# Patient Record
Sex: MALE | Race: OTHER | Employment: FULL TIME | URBAN - METROPOLITAN AREA
[De-identification: names, ages, dates, MRNs, and addresses within clinical notes are randomized per-mention and may not be internally consistent; named-entity substitution may affect disease eponyms.]

---

## 2017-08-08 ENCOUNTER — OFFICE VISIT (OUTPATIENT)
Dept: INTERNAL MEDICINE CLINIC | Facility: CLINIC | Age: 32
End: 2017-08-08

## 2017-08-08 VITALS
TEMPERATURE: 98 F | BODY MASS INDEX: 25.44 KG/M2 | SYSTOLIC BLOOD PRESSURE: 124 MMHG | DIASTOLIC BLOOD PRESSURE: 70 MMHG | HEIGHT: 71 IN | RESPIRATION RATE: 16 BRPM | OXYGEN SATURATION: 99 % | HEART RATE: 64 BPM | WEIGHT: 181.75 LBS

## 2017-08-08 DIAGNOSIS — S89.82XA HYPEREXTENSION INJURY OF LEFT KNEE, INITIAL ENCOUNTER: Primary | ICD-10-CM

## 2017-08-08 DIAGNOSIS — Z72.0 TOBACCO ABUSE: ICD-10-CM

## 2017-08-08 PROCEDURE — 99213 OFFICE O/P EST LOW 20 MIN: CPT | Performed by: PHYSICIAN ASSISTANT

## 2017-08-08 NOTE — PROGRESS NOTES
Sheri Cason is a 29year old male. HPI:   Patient presents for L knee pain which began last night. Was walking down the stairs when he hyperextended his knee as he took a step.   No immediate pain but awoke this morning with pain just below his bowser hyperextension injury: discussed the use of ice, NSAIDs, activity modification. Ok to wear ace wrap for a few days for symptom relief. Consider imaging to eval for medial meniscus injury if no improvement in the next couple weeks.   # Tobacco abuse: cessa

## 2017-10-21 ENCOUNTER — OFFICE VISIT (OUTPATIENT)
Dept: FAMILY MEDICINE CLINIC | Facility: CLINIC | Age: 32
End: 2017-10-21

## 2017-10-21 VITALS
DIASTOLIC BLOOD PRESSURE: 78 MMHG | BODY MASS INDEX: 25.48 KG/M2 | OXYGEN SATURATION: 98 % | HEART RATE: 82 BPM | RESPIRATION RATE: 16 BRPM | SYSTOLIC BLOOD PRESSURE: 140 MMHG | TEMPERATURE: 98 F | HEIGHT: 71 IN | WEIGHT: 182 LBS

## 2017-10-21 DIAGNOSIS — Z00.00 ROUTINE GENERAL MEDICAL EXAMINATION AT A HEALTH CARE FACILITY: Primary | ICD-10-CM

## 2017-10-21 DIAGNOSIS — Z80.0 FAMILY HISTORY OF COLON CANCER IN FATHER: ICD-10-CM

## 2017-10-21 PROCEDURE — 99395 PREV VISIT EST AGE 18-39: CPT | Performed by: FAMILY MEDICINE

## 2017-10-21 NOTE — PATIENT INSTRUCTIONS
Prevention Guidelines, Men Ages 25 to 44  Screening tests and vaccines are an important part of managing your health. Health counseling is essential, too. Below are guidelines for these, for men ages 25 to 44.  Talk with your healthcare provider to make s Hepatitis B Men at increased risk for infection – talk with your healthcare provider 3 doses over 6 months; second dose should be given 1 month after the first dose; the third dose should be given at least 2 months after the second dose and at least 4 jhony © 1511-5216 [employer] [address]. [address] [facility] [place], [facility] [place]. All rights reserved. This information is not intended as a substitute for professional medical care. Always follow your healthcare professional's instructions.

## 2017-10-21 NOTE — PROGRESS NOTES
Adama Bowman is a 29year old male who presents for a complete physical exam.   HPI:   Pt complains of nothing today. Denies any recent illnesses or hospitalizations. He is a homosexual man in only has sex with men.   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 : single. Children: none. Exercise: three times per week.   Diet: watches minimally     REVIEW OF SYSTEMS:   GENERAL: feels well otherwise  SKIN: denies any unusual skin lesions  EYES:denies blurred vision o and aerobic exercise 30 minutes three times weekly.    Health maintenance, will check:   Orders Placed This Encounter      Comp Metabolic Panel (14) [E]      CBC W Differential W Platelet [E]      Lipid Panel [E]      TSH W Reflex To Free T4 [E]      Vitami

## 2017-11-16 ENCOUNTER — LAB ENCOUNTER (OUTPATIENT)
Dept: LAB | Age: 32
End: 2017-11-16
Attending: FAMILY MEDICINE
Payer: COMMERCIAL

## 2017-11-16 DIAGNOSIS — Z00.00 ROUTINE GENERAL MEDICAL EXAMINATION AT A HEALTH CARE FACILITY: ICD-10-CM

## 2017-11-16 PROCEDURE — 85025 COMPLETE CBC W/AUTO DIFF WBC: CPT

## 2017-11-16 PROCEDURE — 84443 ASSAY THYROID STIM HORMONE: CPT

## 2017-11-16 PROCEDURE — 80053 COMPREHEN METABOLIC PANEL: CPT

## 2017-11-16 PROCEDURE — 36415 COLL VENOUS BLD VENIPUNCTURE: CPT

## 2017-11-16 PROCEDURE — 82306 VITAMIN D 25 HYDROXY: CPT

## 2017-11-16 PROCEDURE — 80061 LIPID PANEL: CPT

## 2017-11-16 PROCEDURE — 81003 URINALYSIS AUTO W/O SCOPE: CPT

## 2017-11-20 DIAGNOSIS — R31.9 HEMATURIA, UNSPECIFIED TYPE: ICD-10-CM

## 2017-11-20 DIAGNOSIS — R73.09 ELEVATED GLUCOSE LEVEL: ICD-10-CM

## 2017-11-20 DIAGNOSIS — E55.9 VITAMIN D DEFICIENCY: ICD-10-CM

## 2017-11-20 DIAGNOSIS — E78.2 ELEVATED TRIGLYCERIDES WITH HIGH CHOLESTEROL: ICD-10-CM

## 2017-11-20 DIAGNOSIS — R79.89 ELEVATED LFTS: Primary | ICD-10-CM

## 2017-11-20 NOTE — PROGRESS NOTES
Spoke with Darian at the hospital lab. She stated that the A1C could be added but she had to see the blood to check if the hepatitis panel could be done. She will call the office back line tomorrow morning if not. Please see pended orders.   Thank you

## 2017-11-21 ENCOUNTER — TELEPHONE (OUTPATIENT)
Dept: FAMILY MEDICINE CLINIC | Facility: CLINIC | Age: 32
End: 2017-11-21

## 2017-11-21 DIAGNOSIS — R73.01 IMPAIRED FASTING GLUCOSE: ICD-10-CM

## 2017-11-21 DIAGNOSIS — R79.89 ELEVATED LFTS: Primary | ICD-10-CM

## 2017-11-21 RX ORDER — ERGOCALCIFEROL 1.25 MG/1
50000 CAPSULE ORAL WEEKLY
Qty: 24 CAPSULE | Refills: 0 | Status: SHIPPED | OUTPATIENT
Start: 2017-11-21 | End: 2018-05-20

## 2017-12-27 ENCOUNTER — TELEPHONE (OUTPATIENT)
Dept: FAMILY MEDICINE CLINIC | Facility: CLINIC | Age: 32
End: 2017-12-27

## 2017-12-27 NOTE — TELEPHONE ENCOUNTER
Patient called is an employee of [employer] and needs his Biometric Form sent. Do not see one in chart. Please advise. Thank you.

## 2017-12-27 NOTE — TELEPHONE ENCOUNTER
Call to pt-sts provided form to dr reagan at appt 10/21/17. Per Pancho GIBSON/RN with dr reagan, was waiting for pt to do labs. Record shows labs done 11/16/17. Results entered on form, but pt has not signed form.   Call to pt-advised of above info-pt requests we

## 2018-12-08 ENCOUNTER — OFFICE VISIT (OUTPATIENT)
Dept: FAMILY MEDICINE CLINIC | Facility: CLINIC | Age: 33
End: 2018-12-08
Payer: COMMERCIAL

## 2018-12-08 VITALS
WEIGHT: 181 LBS | BODY MASS INDEX: 25.34 KG/M2 | TEMPERATURE: 97 F | RESPIRATION RATE: 16 BRPM | SYSTOLIC BLOOD PRESSURE: 122 MMHG | HEIGHT: 71 IN | DIASTOLIC BLOOD PRESSURE: 72 MMHG | HEART RATE: 84 BPM

## 2018-12-08 DIAGNOSIS — Z11.3 SCREEN FOR STD (SEXUALLY TRANSMITTED DISEASE): ICD-10-CM

## 2018-12-08 DIAGNOSIS — Z00.00 ROUTINE GENERAL MEDICAL EXAMINATION AT A HEALTH CARE FACILITY: Primary | ICD-10-CM

## 2018-12-08 DIAGNOSIS — R73.01 IMPAIRED FASTING GLUCOSE: ICD-10-CM

## 2018-12-08 PROCEDURE — 99395 PREV VISIT EST AGE 18-39: CPT | Performed by: FAMILY MEDICINE

## 2018-12-08 NOTE — PROGRESS NOTES
Lea Frost is a 34year old male who presents for a complete physical exam.   HPI:   Pt complains of nothing today. Denies any recent illnesses or hospitalizations. He is a homosexual man in only has sex with men. He is engaged to his partner.   He al Negative Negative mg/dl    Urobilinogen Urine 0.2 0.2 - 2.0 mg/dL    Nitrite Urine Negative Negative    Leukocyte Esterase Urine Negative Negative    Microscopic Microscopic not indicated    CBC W/ DIFFERENTIAL   Result Value Ref Range    WBC 9.1 4.0 - 13. REVIEW OF SYSTEMS:   GENERAL: feels well otherwise  SKIN: denies any unusual skin lesions  EYES:denies blurred vision or double vision  HEENT: denies nasal congestion, sinus pain or ST  LUNGS: denies shortness of breath with exertion, denies cough  CAR Metabolic Panel (14) [E]      CBC W Differential W Platelet [E]      TSH W Reflex To Free T4 [E]      Hemoglobin A1C [E]      UA/M With Culture Reflex [E]      HIV AG AB Combo [E]      T Pallidum Screening Cascade [E]      Hepatitis Panel, Acute (4) [E]

## 2018-12-13 ENCOUNTER — LAB ENCOUNTER (OUTPATIENT)
Dept: LAB | Age: 33
End: 2018-12-13
Attending: FAMILY MEDICINE
Payer: COMMERCIAL

## 2018-12-13 DIAGNOSIS — R73.01 IMPAIRED FASTING GLUCOSE: ICD-10-CM

## 2018-12-13 DIAGNOSIS — Z11.3 SCREEN FOR STD (SEXUALLY TRANSMITTED DISEASE): ICD-10-CM

## 2018-12-13 DIAGNOSIS — Z00.00 ROUTINE GENERAL MEDICAL EXAMINATION AT A HEALTH CARE FACILITY: ICD-10-CM

## 2018-12-13 DIAGNOSIS — R74.8 ELEVATED LIVER ENZYMES: ICD-10-CM

## 2018-12-13 DIAGNOSIS — R31.9 HEMATURIA, UNSPECIFIED TYPE: Primary | ICD-10-CM

## 2018-12-13 PROCEDURE — 36415 COLL VENOUS BLD VENIPUNCTURE: CPT

## 2018-12-13 PROCEDURE — 80074 ACUTE HEPATITIS PANEL: CPT

## 2018-12-13 PROCEDURE — 80061 LIPID PANEL: CPT

## 2018-12-13 PROCEDURE — 85025 COMPLETE CBC W/AUTO DIFF WBC: CPT

## 2018-12-13 PROCEDURE — 87389 HIV-1 AG W/HIV-1&-2 AB AG IA: CPT

## 2018-12-13 PROCEDURE — 84443 ASSAY THYROID STIM HORMONE: CPT

## 2018-12-13 PROCEDURE — 87491 CHLMYD TRACH DNA AMP PROBE: CPT

## 2018-12-13 PROCEDURE — 81001 URINALYSIS AUTO W/SCOPE: CPT

## 2018-12-13 PROCEDURE — 86780 TREPONEMA PALLIDUM: CPT

## 2018-12-13 PROCEDURE — 87591 N.GONORRHOEAE DNA AMP PROB: CPT

## 2018-12-13 PROCEDURE — 80053 COMPREHEN METABOLIC PANEL: CPT

## 2018-12-13 PROCEDURE — 83036 HEMOGLOBIN GLYCOSYLATED A1C: CPT

## 2019-02-06 DIAGNOSIS — J02.0 STREP PHARYNGITIS: Primary | ICD-10-CM

## 2019-02-06 RX ORDER — AMOXICILLIN 875 MG/1
875 TABLET, COATED ORAL 2 TIMES DAILY
Qty: 20 TABLET | Refills: 0 | Status: SHIPPED | OUTPATIENT
Start: 2019-02-06 | End: 2019-02-16

## 2019-02-06 NOTE — PROGRESS NOTES
Patient with sore throat, tonsillar swelling, fevers, URI symptoms for past 1-2 days. In-office strep done, positive. Amoxicillin prescription sent to pharmacy. Recommended patient stay off work for 24 hours.

## 2019-07-31 ENCOUNTER — TELEPHONE (OUTPATIENT)
Dept: INTERNAL MEDICINE CLINIC | Facility: CLINIC | Age: 34
End: 2019-07-31

## 2019-11-16 ENCOUNTER — OFFICE VISIT (OUTPATIENT)
Dept: FAMILY MEDICINE CLINIC | Facility: CLINIC | Age: 34
End: 2019-11-16
Payer: COMMERCIAL

## 2019-11-16 VITALS
RESPIRATION RATE: 16 BRPM | WEIGHT: 183 LBS | TEMPERATURE: 98 F | HEIGHT: 71 IN | DIASTOLIC BLOOD PRESSURE: 78 MMHG | SYSTOLIC BLOOD PRESSURE: 118 MMHG | BODY MASS INDEX: 25.62 KG/M2 | HEART RATE: 82 BPM

## 2019-11-16 DIAGNOSIS — Z11.3 SCREEN FOR STD (SEXUALLY TRANSMITTED DISEASE): ICD-10-CM

## 2019-11-16 DIAGNOSIS — Z00.00 ROUTINE GENERAL MEDICAL EXAMINATION AT A HEALTH CARE FACILITY: Primary | ICD-10-CM

## 2019-11-16 PROCEDURE — 99395 PREV VISIT EST AGE 18-39: CPT | Performed by: FAMILY MEDICINE

## 2019-11-16 NOTE — PROGRESS NOTES
Tiarra James is a 30year old male who presents for a complete physical exam.   HPI:   Pt complains of nothing today. Denies any recent illnesses or hospitalizations. He is a homosexual man in only has sex with men. He is  to his partner.   He al Value Ref Range    Urine Color Yellow Yellow    Clarity Urine Clear Clear    Spec Gravity 1.026 1.001 - 1.030    Glucose Urine Negative Negative mg/dl    Bilirubin Urine Negative Negative    Ketones Urine Negative Negative mg/dL    Blood Urine Small (A) Ne Absolute 3.82 1.30 - 6.70 x10(3) uL    Lymphocyte Absolute 2.52 0.90 - 4.00 x10(3) uL    Monocyte Absolute 0.66 0.10 - 1.00 x10(3) uL    Eosinophil Absolute 0.29 0.00 - 0.30 x10(3) uL    Basophil Absolute 0.04 0.00 - 0.10 x10(3) uL    Immature Granulocyte history  ALL/ASTHMA: denies hx of allergy or asthma    EXAM:   /78 (BP Location: Right arm, Patient Position: Sitting, Cuff Size: adult)   Pulse 82   Temp 97.5 °F (36.4 °C)   Resp 16   Ht 71\"   Wt 183 lb (83 kg)   BMI 25.52 kg/m²   Body mass index i

## 2019-11-18 ENCOUNTER — LAB ENCOUNTER (OUTPATIENT)
Dept: LAB | Age: 34
End: 2019-11-18
Attending: FAMILY MEDICINE
Payer: COMMERCIAL

## 2019-11-18 DIAGNOSIS — Z00.00 ROUTINE GENERAL MEDICAL EXAMINATION AT A HEALTH CARE FACILITY: ICD-10-CM

## 2019-11-18 DIAGNOSIS — Z11.3 SCREEN FOR STD (SEXUALLY TRANSMITTED DISEASE): ICD-10-CM

## 2019-11-18 PROCEDURE — 87591 N.GONORRHOEAE DNA AMP PROB: CPT

## 2019-11-18 PROCEDURE — 84443 ASSAY THYROID STIM HORMONE: CPT

## 2019-11-18 PROCEDURE — 87389 HIV-1 AG W/HIV-1&-2 AB AG IA: CPT

## 2019-11-18 PROCEDURE — 80061 LIPID PANEL: CPT

## 2019-11-18 PROCEDURE — 80053 COMPREHEN METABOLIC PANEL: CPT

## 2019-11-18 PROCEDURE — 36415 COLL VENOUS BLD VENIPUNCTURE: CPT

## 2019-11-18 PROCEDURE — 85025 COMPLETE CBC W/AUTO DIFF WBC: CPT

## 2019-11-18 PROCEDURE — 87491 CHLMYD TRACH DNA AMP PROBE: CPT

## 2019-11-18 PROCEDURE — 81001 URINALYSIS AUTO W/SCOPE: CPT

## 2019-11-18 PROCEDURE — 86780 TREPONEMA PALLIDUM: CPT

## 2019-11-20 DIAGNOSIS — R79.9 ABNORMAL BLOOD FINDINGS: ICD-10-CM

## 2019-11-20 DIAGNOSIS — R74.8 ELEVATED LIVER ENZYMES: Primary | ICD-10-CM

## 2020-01-09 ENCOUNTER — OFFICE VISIT (OUTPATIENT)
Dept: INTERNAL MEDICINE CLINIC | Facility: CLINIC | Age: 35
End: 2020-01-09
Payer: COMMERCIAL

## 2020-01-09 VITALS
OXYGEN SATURATION: 98 % | SYSTOLIC BLOOD PRESSURE: 120 MMHG | HEART RATE: 84 BPM | WEIGHT: 187.25 LBS | RESPIRATION RATE: 16 BRPM | BODY MASS INDEX: 26.22 KG/M2 | DIASTOLIC BLOOD PRESSURE: 86 MMHG | TEMPERATURE: 98 F | HEIGHT: 71 IN

## 2020-01-09 DIAGNOSIS — H00.015 HORDEOLUM EXTERNUM LEFT LOWER EYELID: Primary | ICD-10-CM

## 2020-01-09 PROCEDURE — 99213 OFFICE O/P EST LOW 20 MIN: CPT | Performed by: PHYSICIAN ASSISTANT

## 2020-01-09 RX ORDER — ERYTHROMYCIN 5 MG/G
OINTMENT OPHTHALMIC
Qty: 1 TUBE | Refills: 0 | Status: SHIPPED | OUTPATIENT
Start: 2020-01-09 | End: 2020-06-30 | Stop reason: ALTCHOICE

## 2020-01-09 NOTE — PATIENT INSTRUCTIONS
Sty (or Stye)  A sty is an infection of the oil gland of the eyelid. It may develop into a small pocket of pus (an abscess). This can cause pain, redness, and swelling.  In early stages, a sty is treated with antibiotic cream, eye drops, or a small towel © 4697-7567 [employer] [address]. [address] [facility] [place], [place], [facility] [place]. All rights reserved. This information is not intended as a substitute for professional medical care. Always follow your healthcare professional's instructions.

## 2020-01-09 NOTE — PROGRESS NOTES
Zakia Cavanaugh is a 30year old male. HPI:   Patient presents for L eye symptoms x 4 days. C/o red bump near medial canthus of L lower eyelid. Tender to touch and is having some pain radiating around eye.   Headaches - worse when looking at computer sc patient is asked to return in 48 hours if no improvement or worsening of symptoms.

## 2020-06-30 ENCOUNTER — OFFICE VISIT (OUTPATIENT)
Dept: INTERNAL MEDICINE CLINIC | Facility: CLINIC | Age: 35
End: 2020-06-30
Payer: COMMERCIAL

## 2020-06-30 VITALS
TEMPERATURE: 99 F | RESPIRATION RATE: 18 BRPM | BODY MASS INDEX: 27 KG/M2 | OXYGEN SATURATION: 98 % | HEART RATE: 106 BPM | SYSTOLIC BLOOD PRESSURE: 130 MMHG | WEIGHT: 191 LBS | DIASTOLIC BLOOD PRESSURE: 84 MMHG

## 2020-06-30 DIAGNOSIS — L60.8 DISCOLORATION OF NAIL: ICD-10-CM

## 2020-06-30 DIAGNOSIS — L60.0 INGROWN NAIL OF GREAT TOE OF RIGHT FOOT: Primary | ICD-10-CM

## 2020-06-30 PROCEDURE — 99213 OFFICE O/P EST LOW 20 MIN: CPT | Performed by: NURSE PRACTITIONER

## 2020-06-30 NOTE — PATIENT INSTRUCTIONS
Ingrown Toenail, Not Infected (Home Treatment)  An ingrown toenail occurs when the nail grows sideways into the skin next to the nail. This can cause pain, especially when wearing tight shoes.  It can also lead to an infection with redness, swelling, and Follow up with your healthcare provider, or as advised.   When to seek medical advice  Call your healthcare provider right away if any of these occur:  · Increasing redness, pain, or swelling of the toe  · Tender red streaks in the skin leading toward the a

## 2020-06-30 NOTE — PROGRESS NOTES
CHIEF COMPLAINT:     Patient presents with:  Toenail: right big toe ingrown nail      HPI:   Stephen Cheatham is a 37year old male here for right great toe pain. Pt states dog stepped on his toe yesterday. Pt has a history of ingrown toenails.  States the toe

## 2020-08-20 ENCOUNTER — LAB ENCOUNTER (OUTPATIENT)
Dept: LAB | Facility: HOSPITAL | Age: 35
End: 2020-08-20
Attending: PREVENTIVE MEDICINE
Payer: COMMERCIAL

## 2020-08-20 ENCOUNTER — TELEPHONE (OUTPATIENT)
Dept: LAB | Facility: HOSPITAL | Age: 35
End: 2020-08-20

## 2020-08-20 DIAGNOSIS — Z20.822 SUSPECTED COVID-19 VIRUS INFECTION: ICD-10-CM

## 2020-08-20 DIAGNOSIS — Z20.822 SUSPECTED COVID-19 VIRUS INFECTION: Primary | ICD-10-CM

## 2020-08-20 LAB — SARS-COV-2 RNA RESP QL NAA+PROBE: NOT DETECTED

## 2020-10-08 ENCOUNTER — OFFICE VISIT (OUTPATIENT)
Dept: PODIATRY CLINIC | Facility: CLINIC | Age: 35
End: 2020-10-08
Payer: COMMERCIAL

## 2020-10-08 DIAGNOSIS — L60.0 INGROWN TOENAIL: Primary | ICD-10-CM

## 2020-10-08 PROCEDURE — 99203 OFFICE O/P NEW LOW 30 MIN: CPT | Performed by: PODIATRIST

## 2020-10-08 PROCEDURE — 11750 EXCISION NAIL&NAIL MATRIX: CPT | Performed by: PODIATRIST

## 2020-10-08 RX ORDER — CEFADROXIL 500 MG/1
500 CAPSULE ORAL 2 TIMES DAILY
Qty: 20 CAPSULE | Refills: 0 | Status: SHIPPED | OUTPATIENT
Start: 2020-10-08 | End: 2020-10-28

## 2020-10-08 NOTE — PROGRESS NOTES
Jeremy Brewer is a 37year old male. Patient presents with:  New Patient: ingrown nail right hallux - discolored nail on left hallux - pain scale 5/10.         HPI:     Presents today he is got an impacted ingrown nail medial lateral border right hallux is visit.  Physical Exam  GENERAL: well developed, well nourished, in no apparent distress  EXTREMITIES:   1. Integument: Normal skin temperature and turgor  2.  Vascular: Dorsalis pedis two out of four bilateral and posterior tibial pulses two out of   four b 10/22/2020).     Desmond Kent DPM  10/8/2020

## 2020-10-08 NOTE — PATIENT INSTRUCTIONS
Ingrown Toenail (Excised)  An ingrown toenail occurs when the nail grows sideways into the skin next to the nail. This can cause pain and may lead to an infection with redness, swelling, and sometimes drainage.   The most common cause of an ingrown toenail nail bed to become dry and for the swelling to go down. If only the side of the nail was removed, it will begin to grow back in a few months.  To prevent recurrence, sometimes the side of the nail bed may be treated with a strong chemical to prevent the na that side of the nail to help it grow out straight. Follow-up care  Follow up as advised by your healthcare provider. If the ingrown toenail recurs, follow up with a foot specialist (podiatrist) for nail bed ablation.   When to seek medical care  Call your

## 2020-10-08 NOTE — PROCEDURES
Verbal order given by Dr Janelle Potts to draw up 5mls of Sensorcaine 0.5% for permanent removal of medial/lateral border of right hallux nail with chemical destruction of the nail root. I was unable to obtain post injection vitals.

## 2020-11-21 ENCOUNTER — OFFICE VISIT (OUTPATIENT)
Dept: FAMILY MEDICINE CLINIC | Facility: CLINIC | Age: 35
End: 2020-11-21
Payer: COMMERCIAL

## 2020-11-21 VITALS
HEART RATE: 90 BPM | BODY MASS INDEX: 26.32 KG/M2 | SYSTOLIC BLOOD PRESSURE: 134 MMHG | TEMPERATURE: 98 F | DIASTOLIC BLOOD PRESSURE: 86 MMHG | HEIGHT: 71 IN | WEIGHT: 188 LBS

## 2020-11-21 DIAGNOSIS — Z00.00 ROUTINE GENERAL MEDICAL EXAMINATION AT A HEALTH CARE FACILITY: Primary | ICD-10-CM

## 2020-11-21 PROCEDURE — 99395 PREV VISIT EST AGE 18-39: CPT | Performed by: FAMILY MEDICINE

## 2020-11-21 PROCEDURE — 3079F DIAST BP 80-89 MM HG: CPT | Performed by: FAMILY MEDICINE

## 2020-11-21 PROCEDURE — 3008F BODY MASS INDEX DOCD: CPT | Performed by: FAMILY MEDICINE

## 2020-11-21 PROCEDURE — 99072 ADDL SUPL MATRL&STAF TM PHE: CPT | Performed by: FAMILY MEDICINE

## 2020-11-21 PROCEDURE — 3075F SYST BP GE 130 - 139MM HG: CPT | Performed by: FAMILY MEDICINE

## 2020-11-21 NOTE — PROGRESS NOTES
Benitez Horner is a 37year old male who presents for a complete physical exam.   HPI:   Pt complains of nothing today. Denies any recent illnesses or hospitalizations. He is a homosexual man and only has sex with men. He is  to his partner.   He a palpitations  GI: denies abdominal pain,denies heartburn, denies n/v/d/c/blood in stool  : denies nocturia or changes in stream  MUSCULOSKELETAL: denies back pain  NEURO: denies headaches, denies LH/dizziness/syncope  PSYCHE: denies depression or anxiety

## 2020-12-01 ENCOUNTER — LAB ENCOUNTER (OUTPATIENT)
Dept: LAB | Age: 35
End: 2020-12-01
Attending: FAMILY MEDICINE
Payer: COMMERCIAL

## 2020-12-01 DIAGNOSIS — Z00.00 ROUTINE GENERAL MEDICAL EXAMINATION AT A HEALTH CARE FACILITY: ICD-10-CM

## 2020-12-01 PROCEDURE — 81001 URINALYSIS AUTO W/SCOPE: CPT

## 2020-12-01 PROCEDURE — 84443 ASSAY THYROID STIM HORMONE: CPT

## 2020-12-01 PROCEDURE — 80053 COMPREHEN METABOLIC PANEL: CPT

## 2020-12-01 PROCEDURE — 80061 LIPID PANEL: CPT

## 2020-12-01 PROCEDURE — 85025 COMPLETE CBC W/AUTO DIFF WBC: CPT

## 2020-12-01 PROCEDURE — 36415 COLL VENOUS BLD VENIPUNCTURE: CPT

## 2020-12-04 ENCOUNTER — TELEPHONE (OUTPATIENT)
Dept: FAMILY MEDICINE CLINIC | Facility: CLINIC | Age: 35
End: 2020-12-04

## 2020-12-04 DIAGNOSIS — R31.29 MICROSCOPIC HEMATURIA: Primary | ICD-10-CM

## 2020-12-14 ENCOUNTER — MED REC SCAN ONLY (OUTPATIENT)
Dept: FAMILY MEDICINE CLINIC | Facility: CLINIC | Age: 35
End: 2020-12-14

## 2020-12-18 ENCOUNTER — IMMUNIZATION (OUTPATIENT)
Dept: LAB | Facility: HOSPITAL | Age: 35
End: 2020-12-18
Attending: PREVENTIVE MEDICINE
Payer: COMMERCIAL

## 2020-12-18 DIAGNOSIS — Z23 NEED FOR VACCINATION: ICD-10-CM

## 2020-12-18 PROCEDURE — 0001A PFIZER-BIONTECH COVID-19 VACCINE: CPT

## 2021-01-08 ENCOUNTER — IMMUNIZATION (OUTPATIENT)
Dept: LAB | Facility: HOSPITAL | Age: 36
End: 2021-01-08
Attending: PREVENTIVE MEDICINE

## 2021-01-08 DIAGNOSIS — Z23 NEED FOR VACCINATION: ICD-10-CM

## 2021-01-08 PROCEDURE — 0002A SARSCOV2 VAC 30MCG/0.3ML IM: CPT

## 2021-04-19 ENCOUNTER — TELEPHONE (OUTPATIENT)
Dept: FAMILY MEDICINE CLINIC | Facility: CLINIC | Age: 36
End: 2021-04-19

## 2021-04-19 NOTE — TELEPHONE ENCOUNTER
Pt is having lip filler done on 5/11 with Dr. Brian Gibbs. Dr. Brian Gibbs wants pt to take Valacyclovir three days prior and three days after procedure to prevent cold sores, pt has history of cold sores. Per patient, was told to call his PCP for med.     If sent,

## 2021-04-21 NOTE — TELEPHONE ENCOUNTER
Spoke w/pt who states nurse at Dr. Hoa Flowers office told him they do not prescribe meds.     Advised pt to call Dr. Hoa Flowers office back and let them know Dr. Ed Eid asked that Dr. Flex Martin order this med since he is the one doing the procedure and if there is a

## 2021-05-07 NOTE — TELEPHONE ENCOUNTER
Patient said Dr. Robert Puentes told him that Dr. Nikia Ding has to prescribe valacylovir. He was told he has to take it 12hrs before and 12hrs after lip injections. Patient is scheduled for injectiones next Tuesday.           [facility], [address]

## 2021-05-10 NOTE — TELEPHONE ENCOUNTER
Pt advised of below. 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 Per chart his procedure was moved to the 18th.

## 2021-05-10 NOTE — TELEPHONE ENCOUNTER
I have never prescribed valtrex for patient in the past.  Please get dosing from patient and send to pharmacy. Typically valtrex 500mg daily is used for prophylaxis but higher dose is for treatment.

## 2021-05-11 NOTE — TELEPHONE ENCOUNTER
I called and spoke with pt. He decided to not get the prescription of the valtrex. He said no further assistance is needed.

## 2021-05-28 ENCOUNTER — OFFICE VISIT (OUTPATIENT)
Dept: SURGERY | Facility: CLINIC | Age: 36
End: 2021-05-28

## 2021-05-28 DIAGNOSIS — L98.8 RHYTIDES: Primary | ICD-10-CM

## 2021-05-28 NOTE — PROCEDURES
Patient presents requesting left nasolabial fold injectables. Risk, benefits, and alternatives were reviewed with the patient. He expressed understanding wish to proceed. The face was cleansed with alcohol.   1 mL of Volbella XC (LOT [id number], Exp 2021

## 2021-07-08 ENCOUNTER — OFFICE VISIT (OUTPATIENT)
Dept: FAMILY MEDICINE CLINIC | Facility: CLINIC | Age: 36
End: 2021-07-08
Payer: COMMERCIAL

## 2021-07-08 VITALS
SYSTOLIC BLOOD PRESSURE: 124 MMHG | HEIGHT: 71 IN | HEART RATE: 93 BPM | DIASTOLIC BLOOD PRESSURE: 80 MMHG | OXYGEN SATURATION: 97 % | BODY MASS INDEX: 26.25 KG/M2 | WEIGHT: 187.5 LBS | RESPIRATION RATE: 16 BRPM | TEMPERATURE: 97 F

## 2021-07-08 DIAGNOSIS — L03.032 ACUTE PARONYCHIA OF TOE OF LEFT FOOT: Primary | ICD-10-CM

## 2021-07-08 PROCEDURE — 99214 OFFICE O/P EST MOD 30 MIN: CPT | Performed by: FAMILY MEDICINE

## 2021-07-08 PROCEDURE — 3079F DIAST BP 80-89 MM HG: CPT | Performed by: FAMILY MEDICINE

## 2021-07-08 PROCEDURE — 3074F SYST BP LT 130 MM HG: CPT | Performed by: FAMILY MEDICINE

## 2021-07-08 PROCEDURE — 3008F BODY MASS INDEX DOCD: CPT | Performed by: FAMILY MEDICINE

## 2021-07-08 RX ORDER — AMOXICILLIN AND CLAVULANATE POTASSIUM 875; 125 MG/1; MG/1
1 TABLET, FILM COATED ORAL 2 TIMES DAILY
Qty: 20 TABLET | Refills: 0 | Status: SHIPPED | OUTPATIENT
Start: 2021-07-08 | End: 2021-07-18

## 2021-07-08 NOTE — PATIENT INSTRUCTIONS
Paronychia of the Finger or Toe  Paronychia is an infection near a fingernail or toenail. It usually occurs when an opening in the cuticle or an ingrown toenail lets bacteria under the skin. The infection will need to be drained if pus is present.  If t streaks in the skin leading away from the wound  · Pus or fluid draining from the nail area  · Fever of 100.4ºF (38ºC) or higher, or as directed by your provider  Francis last reviewed this educational content on 7/1/2019 © 2000-2021 [employer] normal...

## 2021-07-09 NOTE — PROGRESS NOTES
[facility] Office Note  Chief Complaint:   Patient presents with:  Ingrown Toenail: Lt great toe x 1 yr   Blisters: on both feet       HPI:   This is a 31year old male coming in for pain and redness around the left great toenail. dizziness, syncope, numbness or tingling in the extremities.   MUSCULOSKELETAL:  + left great toe pain    EXAM:   /80 (BP Location: Right arm, Patient Position: Sitting)   Pulse 93   Temp 97.3 °F (36.3 °C) (Temporal)   Resp 16   Ht 5' 11\" (1.803 m) call with any questions, complications, allergies, or worsening or changing symptoms. Patient is to call with any side effects or complications from the treatments as a result of today.      Problem List:  Patient Active Problem List:     Lipoma of back

## 2021-07-22 ENCOUNTER — OFFICE VISIT (OUTPATIENT)
Dept: ORTHOPEDICS CLINIC | Facility: CLINIC | Age: 36
End: 2021-07-22
Payer: COMMERCIAL

## 2021-07-22 VITALS — BODY MASS INDEX: 24.52 KG/M2 | HEIGHT: 73 IN | WEIGHT: 185 LBS

## 2021-07-22 DIAGNOSIS — B35.1 ONYCHOMYCOSIS: ICD-10-CM

## 2021-07-22 DIAGNOSIS — L03.032 PARONYCHIA OF TOE OF LEFT FOOT: Primary | ICD-10-CM

## 2021-07-22 PROCEDURE — 3008F BODY MASS INDEX DOCD: CPT | Performed by: PODIATRIST

## 2021-07-22 PROCEDURE — 99202 OFFICE O/P NEW SF 15 MIN: CPT | Performed by: PODIATRIST

## 2021-07-22 RX ORDER — AMOXICILLIN 500 MG/1
500 CAPSULE ORAL 3 TIMES DAILY
COMMUNITY

## 2021-07-22 NOTE — PROGRESS NOTES
[facility] New Patient Note    CC: Patient presents with:  Toenail Care: left great toenail ingrown      HPI: The patient is a 31year old male who presents today with complaints of thick discolored loose nail for close to 2 years.   No history likely it is nail fungus and the plan would be to remove the nail under local, send it to pathology and then determine if oral Lamisil and a course of topical would be wise as the nail grows out.   We talked about the post procedure course and he needs to c

## 2021-07-23 ENCOUNTER — TELEPHONE (OUTPATIENT)
Dept: ORTHOPEDICS CLINIC | Facility: CLINIC | Age: 36
End: 2021-07-23

## 2021-07-23 NOTE — TELEPHONE ENCOUNTER
----- Message from Alea Blue RN sent at 7/23/2021  8:46 AM CDT -----  Regarding: FW: Prescription Question  Contact: [phone]    ----- Message -----  From: Adama Bowman  Sent: 7/23/2021   8:40 AM CDT  To: [facility] Clinical Pool  Subject:

## 2021-07-23 NOTE — TELEPHONE ENCOUNTER
Pt called and notified that there were not any prescriptions sent to his pharmacy yesterday. Pt notified that at his next visit, there may be prescriptions given at that time. Pt verbalized understanding. No further questions at this time.

## 2021-08-10 ENCOUNTER — OFFICE VISIT (OUTPATIENT)
Dept: SURGERY | Facility: CLINIC | Age: 36
End: 2021-08-10

## 2021-08-10 DIAGNOSIS — L98.8 RHYTIDES: Primary | ICD-10-CM

## 2021-08-10 NOTE — PROGRESS NOTES
Patient presents requesting Botox injection to his forehead and glabella. Also wishes to undergo repeat glabellar injection to his lips. Risk, benefits, and alternatives were reviewed with the patient. He expressed understanding and wished to proceed.

## 2021-08-17 ENCOUNTER — HOSPITAL ENCOUNTER (OUTPATIENT)
Age: 36
Discharge: HOME | End: 2021-08-17
Attending: EMERGENCY MEDICINE
Payer: COMMERCIAL

## 2021-08-17 VITALS
RESPIRATION RATE: 16 BRPM | SYSTOLIC BLOOD PRESSURE: 152 MMHG | HEART RATE: 86 BPM | OXYGEN SATURATION: 98 % | BODY MASS INDEX: 24.52 KG/M2 | HEIGHT: 73 IN | WEIGHT: 185 LBS | DIASTOLIC BLOOD PRESSURE: 77 MMHG | TEMPERATURE: 98 F

## 2021-08-17 DIAGNOSIS — Z20.2 EXPOSURE TO STD: Primary | ICD-10-CM

## 2021-08-17 DIAGNOSIS — B35.1 ONYCHOMYCOSIS: ICD-10-CM

## 2021-08-17 PROCEDURE — 87491 CHLMYD TRACH DNA AMP PROBE: CPT | Performed by: EMERGENCY MEDICINE

## 2021-08-17 PROCEDURE — 96372 THER/PROPH/DIAG INJ SC/IM: CPT

## 2021-08-17 PROCEDURE — 87591 N.GONORRHOEAE DNA AMP PROB: CPT | Performed by: EMERGENCY MEDICINE

## 2021-08-17 PROCEDURE — 99214 OFFICE O/P EST MOD 30 MIN: CPT

## 2021-08-17 PROCEDURE — 99203 OFFICE O/P NEW LOW 30 MIN: CPT

## 2021-08-17 RX ORDER — AZITHROMYCIN 250 MG/1
1000 TABLET, FILM COATED ORAL ONCE
Status: COMPLETED | OUTPATIENT
Start: 2021-08-17 | End: 2021-08-17

## 2021-08-17 NOTE — ED PROVIDER NOTES
Patient Seen in: [facility]      History   Patient presents with:  Ezequiel    Stated Complaint: Male problem    HPI/Subjective:   HPI    51-year-old male who said that his partner tested positive for Neisseria gonorrhea.   He comes in [facility] Discharge Medication List

## 2021-08-17 NOTE — ED INITIAL ASSESSMENT (HPI)
Patient presents to IC for std check for gonorrhea and chlamydia his  tested positive for gonorrhea this week. He is not having any symptoms.

## 2021-08-18 LAB
C TRACH DNA SPEC QL NAA+PROBE: NEGATIVE
N GONORRHOEA DNA SPEC QL NAA+PROBE: NEGATIVE

## 2021-09-02 ENCOUNTER — TELEPHONE (OUTPATIENT)
Dept: ORTHOPEDICS CLINIC | Facility: CLINIC | Age: 36
End: 2021-09-02

## 2021-09-02 ENCOUNTER — OFFICE VISIT (OUTPATIENT)
Dept: ORTHOPEDICS CLINIC | Facility: CLINIC | Age: 36
End: 2021-09-02
Payer: COMMERCIAL

## 2021-09-02 VITALS — WEIGHT: 185 LBS | BODY MASS INDEX: 24.52 KG/M2 | HEIGHT: 73 IN

## 2021-09-02 DIAGNOSIS — B35.1 ONYCHOMYCOSIS: ICD-10-CM

## 2021-09-02 DIAGNOSIS — Z87.891 FORMER SMOKER: Primary | ICD-10-CM

## 2021-09-02 PROCEDURE — 87101 SKIN FUNGI CULTURE: CPT

## 2021-09-02 PROCEDURE — 99213 OFFICE O/P EST LOW 20 MIN: CPT | Performed by: PODIATRIST

## 2021-09-02 PROCEDURE — 11730 AVULSION NAIL PLATE SIMPLE 1: CPT | Performed by: PODIATRIST

## 2021-09-02 PROCEDURE — 3008F BODY MASS INDEX DOCD: CPT | Performed by: PODIATRIST

## 2021-09-02 NOTE — PROGRESS NOTES
[facility] Progress Note    Subjective: Salazar Alcaraz is here for nail removal  Hx of thick discolored  Loose nail for some time  Hx of paronychia    Objective:   Partially loose friable discolored nail, only adhered about 50%.  No surrounding swelling or

## 2021-09-02 NOTE — TELEPHONE ENCOUNTER
I called in Naftin gel 2% to [address] [place] [address] at [phone] 45 gram tube 1 refill per Dr. Verona Ray.

## 2021-10-07 RX ORDER — TERBINAFINE HYDROCHLORIDE 250 MG/1
250 TABLET ORAL DAILY
Qty: 30 TABLET | Refills: 2 | Status: SHIPPED | OUTPATIENT
Start: 2021-10-07 | End: 2021-12-30

## 2023-08-24 ENCOUNTER — NURSE ONLY (OUTPATIENT)
Dept: INTERNAL MEDICINE CLINIC | Facility: HOSPITAL | Age: 38
End: 2023-08-24
Attending: EMERGENCY MEDICINE

## 2023-08-24 DIAGNOSIS — Z00.00 WELLNESS EXAMINATION: Primary | ICD-10-CM

## 2023-08-24 PROCEDURE — 86480 TB TEST CELL IMMUN MEASURE: CPT

## 2023-08-25 LAB
M TB IFN-G CD4+ T-CELLS BLD-ACNC: 0.05 IU/ML
M TB TUBERC IFN-G BLD QL: NEGATIVE
M TB TUBERC IGNF/MITOGEN IGNF CONTROL: >10 IU/ML
QFT TB1 AG MINUS NIL: 0.06 IU/ML
QFT TB2 AG MINUS NIL: 0.03 IU/ML

## 2023-08-29 ENCOUNTER — OFFICE VISIT (OUTPATIENT)
Dept: SURGERY | Facility: CLINIC | Age: 38
End: 2023-08-29

## 2023-08-29 DIAGNOSIS — L98.8 RHYTIDES: Primary | ICD-10-CM

## 2023-10-26 ENCOUNTER — OFFICE VISIT (OUTPATIENT)
Dept: INTERNAL MEDICINE CLINIC | Facility: CLINIC | Age: 38
End: 2023-10-26

## 2023-10-26 VITALS
RESPIRATION RATE: 16 BRPM | HEIGHT: 73 IN | HEART RATE: 90 BPM | BODY MASS INDEX: 25.21 KG/M2 | OXYGEN SATURATION: 97 % | DIASTOLIC BLOOD PRESSURE: 76 MMHG | SYSTOLIC BLOOD PRESSURE: 132 MMHG | WEIGHT: 190.19 LBS | TEMPERATURE: 97 F

## 2023-10-26 DIAGNOSIS — J02.0 ACUTE STREPTOCOCCAL PHARYNGITIS: Primary | ICD-10-CM

## 2023-10-26 LAB
CONTROL LINE PRESENT WITH A CLEAR BACKGROUND (YES/NO): YES YES/NO
KIT LOT #: ABNORMAL NUMERIC
STREP GRP A CUL-SCR: POSITIVE

## 2023-10-26 PROCEDURE — 3075F SYST BP GE 130 - 139MM HG: CPT | Performed by: FAMILY MEDICINE

## 2023-10-26 PROCEDURE — 3008F BODY MASS INDEX DOCD: CPT | Performed by: FAMILY MEDICINE

## 2023-10-26 PROCEDURE — 99213 OFFICE O/P EST LOW 20 MIN: CPT | Performed by: FAMILY MEDICINE

## 2023-10-26 PROCEDURE — 87880 STREP A ASSAY W/OPTIC: CPT | Performed by: FAMILY MEDICINE

## 2023-10-26 PROCEDURE — 3078F DIAST BP <80 MM HG: CPT | Performed by: FAMILY MEDICINE

## 2023-10-26 RX ORDER — AMOXICILLIN 875 MG/1
875 TABLET, COATED ORAL 2 TIMES DAILY
Qty: 20 TABLET | Refills: 0 | Status: SHIPPED | OUTPATIENT
Start: 2023-10-26

## 2023-11-13 ENCOUNTER — OFFICE VISIT (OUTPATIENT)
Dept: INTERNAL MEDICINE CLINIC | Facility: CLINIC | Age: 38
End: 2023-11-13
Payer: COMMERCIAL

## 2023-11-13 VITALS
HEIGHT: 73 IN | SYSTOLIC BLOOD PRESSURE: 116 MMHG | BODY MASS INDEX: 25.18 KG/M2 | DIASTOLIC BLOOD PRESSURE: 72 MMHG | RESPIRATION RATE: 16 BRPM | HEART RATE: 82 BPM | OXYGEN SATURATION: 99 % | WEIGHT: 190 LBS
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
## 2023-11-13 RX ORDER — MONTELUKAST SODIUM 10 MG/1
10 TABLET ORAL NIGHTLY
COMMUNITY

## 2023-11-13 RX ORDER — CEFADROXIL 500 MG/1
500 CAPSULE ORAL 2 TIMES DAILY
Qty: 20 CAPSULE | Refills: 0 | Status: SHIPPED | OUTPATIENT
Start: 2023-11-13

## 2023-12-19 ENCOUNTER — OFFICE VISIT (OUTPATIENT)
Dept: FAMILY MEDICINE CLINIC | Facility: CLINIC | Age: 38
End: 2023-12-19
Payer: COMMERCIAL

## 2023-12-19 VITALS
SYSTOLIC BLOOD PRESSURE: 122 MMHG | BODY MASS INDEX: 25.45 KG/M2 | RESPIRATION RATE: 18 BRPM | HEIGHT: 73 IN | DIASTOLIC BLOOD PRESSURE: 72 MMHG | WEIGHT: 192 LBS | HEART RATE: 88 BPM | TEMPERATURE: 98 F | OXYGEN SATURATION: 97 %

## 2023-12-19 DIAGNOSIS — U07.1 COVID-19: ICD-10-CM

## 2023-12-19 DIAGNOSIS — H69.93 DYSFUNCTION OF BOTH EUSTACHIAN TUBES: ICD-10-CM

## 2023-12-19 DIAGNOSIS — R09.81 NASAL CONGESTION: Primary | ICD-10-CM

## 2023-12-19 LAB
OPERATOR ID: ABNORMAL
RAPID SARS-COV-2 BY PCR: DETECTED

## 2023-12-19 PROCEDURE — 3078F DIAST BP <80 MM HG: CPT | Performed by: NURSE PRACTITIONER

## 2023-12-19 PROCEDURE — 99213 OFFICE O/P EST LOW 20 MIN: CPT | Performed by: NURSE PRACTITIONER

## 2023-12-19 PROCEDURE — U0002 COVID-19 LAB TEST NON-CDC: HCPCS | Performed by: NURSE PRACTITIONER

## 2023-12-19 PROCEDURE — 3074F SYST BP LT 130 MM HG: CPT | Performed by: NURSE PRACTITIONER

## 2023-12-19 PROCEDURE — 3008F BODY MASS INDEX DOCD: CPT | Performed by: NURSE PRACTITIONER

## 2023-12-19 RX ORDER — FLUTICASONE PROPIONATE 50 MCG
1 SPRAY, SUSPENSION (ML) NASAL 2 TIMES DAILY
Qty: 1 EACH | Refills: 0 | Status: SHIPPED | OUTPATIENT
Start: 2023-12-19 | End: 2024-01-18

## 2023-12-26 ENCOUNTER — TELEPHONE (OUTPATIENT)
Dept: INTERNAL MEDICINE CLINIC | Facility: CLINIC | Age: 38
End: 2023-12-26

## 2024-01-08 ENCOUNTER — OFFICE VISIT (OUTPATIENT)
Dept: FAMILY MEDICINE CLINIC | Facility: CLINIC | Age: 39
End: 2024-01-08
Payer: COMMERCIAL

## 2024-01-08 VITALS
DIASTOLIC BLOOD PRESSURE: 80 MMHG | SYSTOLIC BLOOD PRESSURE: 120 MMHG | WEIGHT: 190 LBS | HEART RATE: 88 BPM | TEMPERATURE: 97 F | RESPIRATION RATE: 18 BRPM | HEIGHT: 73 IN | BODY MASS INDEX: 25.18 KG/M2

## 2024-01-08 DIAGNOSIS — B35.1 ONYCHOMYCOSIS: Primary | ICD-10-CM

## 2024-01-08 NOTE — PROGRESS NOTES
[facility] Office Note  Chief Complaint:   Chief Complaint   Patient presents with    Toenail       HPI:   This is a 38 year old male coming in for yellowing of the left great toenail.  This been ongoing for a few years.  He was going to take terbinafine in the past but did not get around to trying it.  He would like to try it now.      No past medical history on file.  No past surgical history on file.  Social History:  Social History     Socioeconomic History    Marital status:    Tobacco Use    Smoking status: Every Day     Packs/day: .5     Types: Cigarettes     Last attempt to quit: 10/7/2017     Years since quittin.2    Smokeless tobacco: Never   Vaping Use    Vaping Use: Never used   Substance and Sexual Activity    Alcohol use: Yes     Alcohol/week: 0.0 standard drinks of alcohol     Comment: social    Drug use: No   Other Topics Concern    Caffeine Concern No    Stress Concern No    Weight Concern No    Special Diet No    Exercise No    Seat Belt Yes     Family History:  Family History   Problem Relation Age of Onset    Cancer Father         prostate ca    Diabetes Maternal Grandmother     Other (Other) Sister         thyroid     Allergies:  No Known Allergies  Current Meds:  Current Outpatient Medications   Medication Sig Dispense Refill    montelukast 10 MG Oral Tab Take 1 tablet (10 mg total) by mouth nightly.        Ready to quit: Not Answered  Counseling given: Not Answered       REVIEW OF SYSTEMS:   ROS:  CONSTITUTIONAL:  Denies any unusual weight gain/loss, fever, chills, weakness or fatigue.  CARDIOVASCULAR:  Denies chest pain, chest pressure or chest discomfort. No palpitations or edema.  Denies any dyspnea on exertion or at rest  RESPIRATORY:  Denies shortness of breath, cough  GASTROINTESTINAL:  Denies any abdominal pain, nausea, vomiting  NEUROLOGICAL:  Denies headache, dizziness, syncope, numbness or tingling in the extremities.  MUSCULOSKELETAL:  Denies  muscle, back pain, joint pain or stiffness.    EXAM:   /80   Pulse 88   Temp 97.2 °F (36.2 °C) (Temporal)   Resp 18   Ht 6' 1\" (1.854 m)   Wt 190 lb (86.2 kg)   BMI 25.07 kg/m²  Estimated body mass index is 25.07 kg/m² as calculated from the following:    Height as of this encounter: 6' 1\" (1.854 m).    Weight as of this encounter: 190 lb (86.2 kg).   Vital signs reviewed.Appears stated age, well groomed.  Physical Exam:  GEN:  Patient is alert and oriented x3, no apparent distress  HEAD:  Normocephalic, atraumatic  SKIN: + yellowing of left great toenail  LUNGS: clear to auscultation bilaterally, no rales/rhonchi/wheezing  HEART:  Regular rate and rhythm, no murmurs, rubs or gallops  ABDOMEN:  Soft, nondistended, nontender, bowel sounds normal in all 4 quadrants, no hepatosplenomegaly  EXTREMITIES:  Strength intact with 5/5 bilaterally upper and lower extremities, no edema noted  NEURO:  CN 2 - 12 grossly intact     ASSESSMENT AND PLAN:   1. Onychomycosis  -  check LFTs and if wnl, will start terbinafine 250mg daily  - Comp Metabolic Panel (14) [E]; Future      Meds & Refills for this Visit:  Requested Prescriptions      No prescriptions requested or ordered in this encounter       Health Maintenance:  Health Maintenance Due   Topic Date Due    Annual Physical  Never done    Pneumococcal Vaccine: Birth to 64yrs (1 - PCV) Never done    Tobacco Cessation Counseling 1 Year  Never done    COVID-19 Vaccine (3 - 2023-24 season) 09/01/2023    Influenza Vaccine (1) 10/01/2023    Annual Depression Screening  01/01/2024       Patient/Caregiver Education: Patient/Caregiver Education: There are no barriers to learning. Medical education done.   Outcome: Patient verbalizes understanding. Patient is notified to call with any questions, complications, allergies, or worsening or changing symptoms.  Patient is to call with any side effects or complications from the treatments as a result of today.     Problem  List:  Patient Active Problem List   Diagnosis    Lipoma of back    Lipoma of forearm    Submuscular lipoma of chest    Tobacco abuse    Rhytides       KEYANA WHITNEY, DO    Please note that portions of this note may have been completed with a voice recognition program. Efforts were made to edit the dictations but occasionally words are mis-transcribed.

## 2024-01-10 ENCOUNTER — LAB ENCOUNTER (OUTPATIENT)
Dept: LAB | Age: 39
End: 2024-01-10
Attending: FAMILY MEDICINE
Payer: COMMERCIAL

## 2024-01-10 DIAGNOSIS — B35.1 ONYCHOMYCOSIS: ICD-10-CM

## 2024-01-10 DIAGNOSIS — R74.8 ELEVATED LIVER ENZYMES: Primary | ICD-10-CM

## 2024-01-10 LAB
ALBUMIN SERPL-MCNC: 4.1 G/DL (ref 3.4–5)
ALBUMIN/GLOB SERPL: 1.3 {RATIO} (ref 1–2)
ALP LIVER SERPL-CCNC: 120 U/L
ALT SERPL-CCNC: 94 U/L
ANION GAP SERPL CALC-SCNC: <0 MMOL/L (ref 0–18)
AST SERPL-CCNC: 49 U/L (ref 15–37)
BILIRUB SERPL-MCNC: 0.6 MG/DL (ref 0.1–2)
BUN BLD-MCNC: 9 MG/DL (ref 9–23)
CALCIUM BLD-MCNC: 8.6 MG/DL (ref 8.5–10.1)
CHLORIDE SERPL-SCNC: 112 MMOL/L (ref 98–112)
CO2 SERPL-SCNC: 27 MMOL/L (ref 21–32)
CREAT BLD-MCNC: 1.04 MG/DL
EGFRCR SERPLBLD CKD-EPI 2021: 95 ML/MIN/1.73M2 (ref 60–?)
FASTING STATUS PATIENT QL REPORTED: NO
GLOBULIN PLAS-MCNC: 3.2 G/DL (ref 2.8–4.4)
GLUCOSE BLD-MCNC: 104 MG/DL (ref 70–99)
OSMOLALITY SERPL CALC.SUM OF ELEC: 285 MOSM/KG (ref 275–295)
POTASSIUM SERPL-SCNC: 4.1 MMOL/L (ref 3.5–5.1)
PROT SERPL-MCNC: 7.3 G/DL (ref 6.4–8.2)
SODIUM SERPL-SCNC: 138 MMOL/L (ref 136–145)

## 2024-01-10 PROCEDURE — 36415 COLL VENOUS BLD VENIPUNCTURE: CPT

## 2024-01-10 PROCEDURE — 80053 COMPREHEN METABOLIC PANEL: CPT

## 2024-03-21 ENCOUNTER — OFFICE VISIT (OUTPATIENT)
Dept: FAMILY MEDICINE CLINIC | Facility: CLINIC | Age: 39
End: 2024-03-21
Payer: COMMERCIAL

## 2024-03-21 ENCOUNTER — LAB ENCOUNTER (OUTPATIENT)
Dept: LAB | Age: 39
End: 2024-03-21
Attending: FAMILY MEDICINE
Payer: COMMERCIAL

## 2024-03-21 VITALS
HEIGHT: 73 IN | SYSTOLIC BLOOD PRESSURE: 120 MMHG | DIASTOLIC BLOOD PRESSURE: 80 MMHG | HEART RATE: 67 BPM | BODY MASS INDEX: 25.98 KG/M2 | TEMPERATURE: 97 F | WEIGHT: 196 LBS | RESPIRATION RATE: 18 BRPM

## 2024-03-21 DIAGNOSIS — Z00.00 LABORATORY EXAMINATION ORDERED AS PART OF A ROUTINE GENERAL MEDICAL EXAMINATION: ICD-10-CM

## 2024-03-21 DIAGNOSIS — Z80.42 FAMILY HISTORY OF PROSTATE CANCER IN FATHER: ICD-10-CM

## 2024-03-21 DIAGNOSIS — R35.0 INCREASED URINARY FREQUENCY: ICD-10-CM

## 2024-03-21 DIAGNOSIS — L72.9 SUBCUTANEOUS CYST: ICD-10-CM

## 2024-03-21 DIAGNOSIS — F17.210 CIGARETTE NICOTINE DEPENDENCE WITHOUT COMPLICATION: ICD-10-CM

## 2024-03-21 DIAGNOSIS — Z11.3 SCREEN FOR STD (SEXUALLY TRANSMITTED DISEASE): ICD-10-CM

## 2024-03-21 DIAGNOSIS — Z00.00 LABORATORY EXAMINATION ORDERED AS PART OF A ROUTINE GENERAL MEDICAL EXAMINATION: Primary | ICD-10-CM

## 2024-03-21 LAB
ALBUMIN SERPL-MCNC: 4.5 G/DL (ref 3.2–4.8)
ALBUMIN/GLOB SERPL: 1.6 {RATIO} (ref 1–2)
ALP LIVER SERPL-CCNC: 115 U/L
ALT SERPL-CCNC: 100 U/L
ANION GAP SERPL CALC-SCNC: 5 MMOL/L (ref 0–18)
AST SERPL-CCNC: 57 U/L (ref ?–34)
BASOPHILS # BLD AUTO: 0.07 X10(3) UL (ref 0–0.2)
BASOPHILS NFR BLD AUTO: 0.8 %
BILIRUB SERPL-MCNC: 0.4 MG/DL (ref 0.3–1.2)
BILIRUB UR QL: NEGATIVE
BUN BLD-MCNC: 9 MG/DL (ref 9–23)
BUN/CREAT SERPL: 10.1 (ref 10–20)
CALCIUM BLD-MCNC: 9.2 MG/DL (ref 8.7–10.4)
CHLORIDE SERPL-SCNC: 108 MMOL/L (ref 98–112)
CHOLEST SERPL-MCNC: 197 MG/DL (ref ?–200)
CLARITY UR: CLEAR
CO2 SERPL-SCNC: 27 MMOL/L (ref 21–32)
COLOR UR: YELLOW
CREAT BLD-MCNC: 0.89 MG/DL
DEPRECATED RDW RBC AUTO: 41.9 FL (ref 35.1–46.3)
EGFRCR SERPLBLD CKD-EPI 2021: 114 ML/MIN/1.73M2 (ref 60–?)
EOSINOPHIL # BLD AUTO: 0.37 X10(3) UL (ref 0–0.7)
EOSINOPHIL NFR BLD AUTO: 4.3 %
ERYTHROCYTE [DISTWIDTH] IN BLOOD BY AUTOMATED COUNT: 13 % (ref 11–15)
FASTING PATIENT LIPID ANSWER: YES
FASTING STATUS PATIENT QL REPORTED: YES
GLOBULIN PLAS-MCNC: 2.8 G/DL (ref 2.8–4.4)
GLUCOSE BLD-MCNC: 100 MG/DL (ref 70–99)
GLUCOSE UR-MCNC: NORMAL MG/DL
HCT VFR BLD AUTO: 51.2 %
HDLC SERPL-MCNC: 37 MG/DL (ref 40–59)
HGB BLD-MCNC: 17.6 G/DL
IMM GRANULOCYTES # BLD AUTO: 0.02 X10(3) UL (ref 0–1)
IMM GRANULOCYTES NFR BLD: 0.2 %
KETONES UR-MCNC: NEGATIVE MG/DL
LDLC SERPL CALC-MCNC: 133 MG/DL (ref ?–100)
LEUKOCYTE ESTERASE UR QL STRIP.AUTO: NEGATIVE
LYMPHOCYTES # BLD AUTO: 3.01 X10(3) UL (ref 1–4)
LYMPHOCYTES NFR BLD AUTO: 35.2 %
MCH RBC QN AUTO: 30 PG (ref 26–34)
MCHC RBC AUTO-ENTMCNC: 34.4 G/DL (ref 31–37)
MCV RBC AUTO: 87.4 FL
MONOCYTES # BLD AUTO: 0.69 X10(3) UL (ref 0.1–1)
MONOCYTES NFR BLD AUTO: 8.1 %
NEUTROPHILS # BLD AUTO: 4.38 X10 (3) UL (ref 1.5–7.7)
NEUTROPHILS # BLD AUTO: 4.38 X10(3) UL (ref 1.5–7.7)
NEUTROPHILS NFR BLD AUTO: 51.4 %
NITRITE UR QL STRIP.AUTO: NEGATIVE
NONHDLC SERPL-MCNC: 160 MG/DL (ref ?–130)
OSMOLALITY SERPL CALC.SUM OF ELEC: 289 MOSM/KG (ref 275–295)
PH UR: 6 [PH] (ref 5–8)
PLATELET # BLD AUTO: 257 10(3)UL (ref 150–450)
POTASSIUM SERPL-SCNC: 4.3 MMOL/L (ref 3.5–5.1)
PROT SERPL-MCNC: 7.3 G/DL (ref 5.7–8.2)
PROT UR-MCNC: 20 MG/DL
RBC # BLD AUTO: 5.86 X10(6)UL
SODIUM SERPL-SCNC: 140 MMOL/L (ref 136–145)
SP GR UR STRIP: 1.03 (ref 1–1.03)
TRIGL SERPL-MCNC: 151 MG/DL (ref 30–149)
TSI SER-ACNC: 1.44 MIU/ML (ref 0.55–4.78)
UROBILINOGEN UR STRIP-ACNC: NORMAL
VLDLC SERPL CALC-MCNC: 28 MG/DL (ref 0–30)
WBC # BLD AUTO: 8.5 X10(3) UL (ref 4–11)

## 2024-03-21 PROCEDURE — 80061 LIPID PANEL: CPT

## 2024-03-21 PROCEDURE — 84443 ASSAY THYROID STIM HORMONE: CPT

## 2024-03-21 PROCEDURE — 81001 URINALYSIS AUTO W/SCOPE: CPT

## 2024-03-21 PROCEDURE — 36415 COLL VENOUS BLD VENIPUNCTURE: CPT

## 2024-03-21 PROCEDURE — 85025 COMPLETE CBC W/AUTO DIFF WBC: CPT

## 2024-03-21 PROCEDURE — 99395 PREV VISIT EST AGE 18-39: CPT | Performed by: FAMILY MEDICINE

## 2024-03-21 PROCEDURE — 80053 COMPREHEN METABOLIC PANEL: CPT

## 2024-03-21 RX ORDER — BUPROPION HYDROCHLORIDE 150 MG/1
150 TABLET, EXTENDED RELEASE ORAL 2 TIMES DAILY
Qty: 180 TABLET | Refills: 0 | Status: SHIPPED | OUTPATIENT
Start: 2024-03-21

## 2024-03-21 NOTE — PROGRESS NOTES
Jericho Guillory Jr. is a 38 year old male who presents for a complete physical exam.   HPI:   Pt complains of nothing today.  Denies any recent illnesses or hospitalizations.  He is a homosexual man and only has sex with men.  He is  to his partner.  He also does state that his father has prostate cancer.  Admits to some increased urinary frequency.;  Denies any family history colon cancer.  His tetanus and flu vaccines are up-to-date.    Wt Readings from Last 6 Encounters:   03/21/24 196 lb (88.9 kg)   02/01/24 191 lb (86.6 kg)   01/08/24 190 lb (86.2 kg)   12/19/23 192 lb (87.1 kg)   11/13/23 190 lb (86.2 kg)   10/26/23 190 lb 3.2 oz (86.3 kg)     Body mass index is 25.86 kg/m².     Results for orders placed or performed in visit on 01/10/24   Comp Metabolic Panel (14) [E]   Result Value Ref Range    Glucose 104 (H) 70 - 99 mg/dL    Sodium 138 136 - 145 mmol/L    Potassium 4.1 3.5 - 5.1 mmol/L    Chloride 112 98 - 112 mmol/L    CO2 27.0 21.0 - 32.0 mmol/L    Anion Gap <0 (L) 0 - 18 mmol/L    BUN 9 9 - 23 mg/dL    Creatinine 1.04 0.70 - 1.30 mg/dL    Calcium, Total 8.6 8.5 - 10.1 mg/dL    Calculated Osmolality 285 275 - 295 mOsm/kg    eGFR-Cr 95 >=60 mL/min/1.73m2    AST 49 (H) 15 - 37 U/L    ALT 94 (H) 16 - 61 U/L    Alkaline Phosphatase 120 (H) 45 - 117 U/L    Bilirubin, Total 0.6 0.1 - 2.0 mg/dL    Total Protein 7.3 6.4 - 8.2 g/dL    Albumin 4.1 3.4 - 5.0 g/dL    Globulin  3.2 2.8 - 4.4 g/dL    A/G Ratio 1.3 1.0 - 2.0    Patient Fasting for CMP? No          Current Outpatient Medications   Medication Sig Dispense Refill    buPROPion  MG Oral Tablet 12 Hr Take 1 tablet (150 mg total) by mouth 2 (two) times daily. 180 tablet 0    nicotine 14 MG/24HR Transdermal Patch 24 Hr Place 1 patch onto the skin daily for 28 days. 28 each 0    [START ON 3/30/2024] nicotine 7 MG/24HR Transdermal Patch 24 Hr Place 1 patch onto the skin daily for 28 days. 28 each 0    montelukast 10 MG Oral Tab Take 1 tablet (10 mg  total) by mouth nightly.      nicotine polacrilex (NICOTINE MINI) 2 MG Mouth/Throat Lozenge 1 lozenge every 1-2 hours PRN x 2 weeks then 1 lozenge every 2-4 hours PRN x 2 weeks then 1 lozenge every 4-8 hours PRN x 2 weeks (Patient not taking: Reported on 3/21/2024) 168 each 1      No Known Allergies   No past medical history on file.   No past surgical history on file.   Family History   Problem Relation Age of Onset    Cancer Father         prostate ca    Diabetes Maternal Grandmother     Other (Other) Sister         thyroid      Social History:  Social History     Socioeconomic History    Marital status:    Tobacco Use    Smoking status: Every Day     Packs/day: .5     Types: Cigarettes     Last attempt to quit: 10/7/2017     Years since quittin.4    Smokeless tobacco: Never   Vaping Use    Vaping Use: Never used   Substance and Sexual Activity    Alcohol use: Yes     Alcohol/week: 0.0 standard drinks of alcohol     Comment: social    Drug use: No   Other Topics Concern    Caffeine Concern No    Stress Concern No    Weight Concern No    Special Diet No    Exercise No    Seat Belt Yes      Occ: P[employer] with [employer]. : . Children: none.   Exercise: three times per week.  Diet: watches minimally     REVIEW OF SYSTEMS:   GENERAL: feels well otherwise  SKIN: denies any unusual skin lesions  EYES:denies blurred vision or double vision  HEENT: denies nasal congestion, sinus pain or ST  LUNGS: denies shortness of breath with exertion, denies cough  CARDIOVASCULAR: denies chest pain on exertion or at rest, denies palpitations  GI: denies abdominal pain,denies heartburn, denies n/v/d/c/blood in stool  : denies nocturia or changes in stream  MUSCULOSKELETAL: denies back pain  NEURO: denies headaches, denies LH/dizziness/syncope  PSYCHE: denies depression or anxiety  HEMATOLOGIC: denies hx of anemia  ENDOCRINE: denies thyroid history  ALL/ASTHMA: denies hx of allergy or asthma    EXAM:   /80    Pulse 67   Temp 97.2 °F (36.2 °C) (Temporal)   Resp 18   Ht 6' 1\" (1.854 m)   Wt 196 lb (88.9 kg)   BMI 25.86 kg/m²   Body mass index is 25.86 kg/m².   GENERAL: well developed, well nourished,in no apparent distress  SKIN: no rashes,no suspicious lesions  HEENT: atraumatic, normocephalic,ears and throat are clear  EYES:PERRLA, EOMI, conjunctiva are clear  NECK: supple,no adenopathy,no thyromegaly  LUNGS: clear to auscultation, no r/r/w  CARDIO: RRR without murmur  GI: good BS's,no masses, HSM or tenderness  :  two descended testes,+ varicocele on the left,no hernia,no penile lesions, + cyst in left groin with no overlying erythema or TTP  MUSCULOSKELETAL: back is not tender,FROM of the back  EXTREMITIES: no cyanosis, clubbing or edema  NEURO: Oriented times three,cranial nerves are intact,motor and sensory are grossly intact; 2 + knee reflexes bilaterally  VASCULAR: 2 + dorsalis pedal pulses bilaterally    ASSESSMENT AND PLAN:   Jericho Guillory Jr. is a 38 year old male who presents for a complete physical exam.    Pt's weight is Body mass index is 25.86 kg/m²., recommended low fat diet and aerobic exercise 30 minutes three times weekly.   Health maintenance, will check:   Orders Placed This Encounter   Procedures    CBC With Differential With Platelet    Comp Metabolic Panel (14)    Lipid Panel    Urinalysis, Routine    TSH W Reflex To Free T4       Tdap and flu vaccines are up to date    STD screening - STD panel offered and patient agreed    Smoker - trial of wellbutrin, encourage smoking cessation, stop nicotine patches    Increased urinary frequency and family h/o prostate cancer in father - refer to urology for evaluation    Subcutaneous cyst - in groin, refer to general surgery for excision    The patient indicates understanding of these issues and agrees to the plan.    The patient is asked to return in 6 months pending lab resutls.

## 2024-05-07 ENCOUNTER — OFFICE VISIT (OUTPATIENT)
Dept: INTERNAL MEDICINE CLINIC | Facility: CLINIC | Age: 39
End: 2024-05-07
Payer: COMMERCIAL

## 2024-05-07 VITALS
OXYGEN SATURATION: 99 % | HEART RATE: 96 BPM | TEMPERATURE: 98 F | RESPIRATION RATE: 16 BRPM | BODY MASS INDEX: 25.58 KG/M2 | WEIGHT: 193 LBS | HEIGHT: 73 IN | SYSTOLIC BLOOD PRESSURE: 120 MMHG | DIASTOLIC BLOOD PRESSURE: 76 MMHG
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
## 2024-05-07 PROCEDURE — 99213 OFFICE O/P EST LOW 20 MIN: CPT | Performed by: FAMILY MEDICINE

## 2024-05-07 RX ORDER — CEFADROXIL 500 MG/1
500 CAPSULE ORAL 2 TIMES DAILY
Qty: 20 CAPSULE | Refills: 0 | Status: SHIPPED | OUTPATIENT
Start: 2024-05-07

## 2024-05-07 RX ORDER — METHYLPREDNISOLONE 4 MG/1
TABLET ORAL
Qty: 1 EACH | Refills: 0 | Status: SHIPPED | OUTPATIENT
Start: 2024-05-07

## 2024-05-07 NOTE — PROGRESS NOTES
CHIEF COMPLAINT:     Chief Complaint   Patient presents with    Bite Sting,Insect     Saturday, R inner thigh. Warm to touch.        HPI:   Jericho Guillory Jr. is a 39 year old male .  The patient complains of a probable insect bite. Noticed the bite on Saturday.  Pt was outside and felt some bug  bite him.The bite is located on the right inner thigh. The bite is itchy. Has tried otc antibiotic ointment with no relief. Has had similar bites in the past but this one seems worse.    Current Outpatient Medications   Medication Sig Dispense Refill    cefadroxil 500 MG Oral Cap Take 1 capsule (500 mg total) by mouth 2 (two) times daily. 20 capsule 0    methylPREDNISolone (MEDROL) 4 MG Oral Tablet Therapy Pack As directed. 1 each 0    montelukast 10 MG Oral Tab Take 1 tablet (10 mg total) by mouth nightly.      buPROPion  MG Oral Tablet 12 Hr Take 1 tablet (150 mg total) by mouth 2 (two) times daily. (Patient not taking: Reported on 2024) 180 tablet 0      No past medical history on file.   Social History:  Social History     Socioeconomic History    Marital status:    Tobacco Use    Smoking status: Every Day     Current packs/day: 0.00     Types: Cigarettes     Last attempt to quit: 10/7/2017     Years since quittin.5    Smokeless tobacco: Never   Vaping Use    Vaping status: Never Used   Substance and Sexual Activity    Alcohol use: Yes     Alcohol/week: 0.0 standard drinks of alcohol     Comment: social    Drug use: No   Other Topics Concern    Caffeine Concern No    Stress Concern No    Weight Concern No    Special Diet No    Exercise No    Seat Belt Yes        REVIEW OF SYSTEMS:   GENERAL: Denies fever, chills,weight change, decreased appetite  SKIN: see HPI.  EYES: Denies blurred vision or double vision  HENT: Denies congestion, rhinorrhea, sore throat or ear pain  CHEST: Denies chest pain, or palpitations  LUNGS: Denies shortness of breath, cough, or wheezing  GI: Denies abdominal pain, N/V/C/D.    MUSCULOSKELETAL: no arthralgia or swollen joints  LYMPH:  Denies lymphadenopathy  NEURO: Denies headaches or lightheadedness      EXAM:   /76 (BP Location: Left arm, Patient Position: Sitting, Cuff Size: adult)   Pulse 96   Temp 98.2 °F (36.8 °C) (Temporal)   Resp 16   Ht 6' 1\" (1.854 m)   Wt 193 lb (87.5 kg)   SpO2 99%   BMI 25.46 kg/m²   GENERAL: well developed, well nourished,in no apparent distress  SKIN: Right inner thigh- nickel size papula with surrounding erythema that is tender and warm to touch.No drg.  HEAD: atraumatic, normocephalic  EYES: conjunctiva clear  LUNGS: clear to auscultation bilaterally, no wheezes or rhonchi. Breathing is non labored.  CARDIO: RRR without murmur  EXTREMITIES: no edema    Physical Exam    ASSESSMENT AND PLAN:     Encounter Diagnosis   Name Primary?    Bug bite with infection, initial encounter Yes       No orders of the defined types were placed in this encounter.      Meds & Refills for this Visit:  Requested Prescriptions     Signed Prescriptions Disp Refills    cefadroxil 500 MG Oral Cap 20 capsule 0     Sig: Take 1 capsule (500 mg total) by mouth 2 (two) times daily.    methylPREDNISolone (MEDROL) 4 MG Oral Tablet Therapy Pack 1 each 0     Sig: As directed.       Imaging & Consults:  None    PLAN:  Skin care discussed with patient.  Benadryl or zyrtec for the itching. Pt to watch the area or increase in redness,pain ,drg. Medication as above.   The patient indicates understanding of these issues and agrees to the plan.  The patient is asked to return if worsening.

## 2024-05-09 ENCOUNTER — HOSPITAL ENCOUNTER (OUTPATIENT)
Dept: ULTRASOUND IMAGING | Age: 39
Discharge: HOME | End: 2024-05-09
Attending: FAMILY MEDICINE
Payer: COMMERCIAL

## 2024-05-09 DIAGNOSIS — R74.8 ELEVATED LIVER ENZYMES: ICD-10-CM

## 2024-05-09 PROCEDURE — 76700 US EXAM ABDOM COMPLETE: CPT | Performed by: FAMILY MEDICINE

## 2024-05-21 RX ORDER — CEFADROXIL 500 MG/1
500 CAPSULE ORAL 2 TIMES DAILY
Qty: 20 CAPSULE | Refills: 0 | Status: SHIPPED | OUTPATIENT
Start: 2024-05-21

## 2024-05-21 NOTE — TELEPHONE ENCOUNTER
Patient requesting another round of antibiotics for bug bite. Thigh is still red and there is some swelling.     Pended. Please advise.

## 2024-06-10 ENCOUNTER — OFFICE VISIT (OUTPATIENT)
Dept: INTERNAL MEDICINE CLINIC | Facility: CLINIC | Age: 39
End: 2024-06-10
Payer: COMMERCIAL

## 2024-06-10 DIAGNOSIS — J02.0 ACUTE STREPTOCOCCAL PHARYNGITIS: Primary | ICD-10-CM

## 2024-06-10 LAB
CONTROL LINE PRESENT WITH A CLEAR BACKGROUND (YES/NO): YES YES/NO
KIT LOT #: 7023 NUMERIC
STREP GRP A CUL-SCR: POSITIVE

## 2024-06-10 RX ORDER — AMOXICILLIN AND CLAVULANATE POTASSIUM 875; 125 MG/1; MG/1
1 TABLET, FILM COATED ORAL 2 TIMES DAILY
Qty: 20 TABLET | Refills: 0 | Status: SHIPPED | OUTPATIENT
Start: 2024-06-10 | End: 2024-06-20

## 2024-06-10 NOTE — PROGRESS NOTES
Patient Office Visit    ASSESSMENT AND PLAN:   1. Acute streptococcal pharyngitis  Note: As strep test was positive we will start antibiotics below.  Discussed the side effects.  Work note provided.  Recommended to take ibuprofen 600 mg up to 3 times a day with food and start salt water gargles  - Rapid Strep  - amoxicillin clavulanate 875-125 MG Oral Tab; Take 1 tablet by mouth 2 (two) times daily for 10 days.  Dispense: 20 tablet; Refill: 0      Follow up in 3 to 5 days or sooner if symptoms get worse     Patient/Caregiver Education: Patient/Caregiver Education: There are no barriers to learning. Medical education done. Outcome: Patient verbalizes understanding. Patient is notified to call with any questions, complications, allergies, or worsening or changing symptoms.  Patient is to call with any side effects or complications from the treatments as a result of today.      Reviewed Past Medical History and   Patient Active Problem List   Diagnosis    Lipoma of back    Lipoma of forearm    Submuscular lipoma of chest    Tobacco abuse    Rhytides       Orders Placed This Encounter   Procedures    Rapid Strep     Order Specific Question:   Release to patient     Answer:   Immediate     Requested Prescriptions     Signed Prescriptions Disp Refills    amoxicillin clavulanate 875-125 MG Oral Tab 20 tablet 0     Sig: Take 1 tablet by mouth 2 (two) times daily for 10 days.         Krupa Francisco DO  CC:  Sore throat         HPI:   Jericho PRIEST Kahlil Schroeder is a 39-year-old male who presents with sore throat.  The sore throat started today and he has a history of strep throat in the past.  No fevers but is having difficulty swallowing.    No past medical history on file.    No past surgical history on file.    Social History:  Social History     Socioeconomic History    Marital status:    Tobacco Use    Smoking status: Every Day     Current packs/day: 0.00     Types: Cigarettes     Last attempt to quit: 10/7/2017      Years since quittin.6    Smokeless tobacco: Never   Vaping Use    Vaping status: Never Used   Substance and Sexual Activity    Alcohol use: Yes     Alcohol/week: 0.0 standard drinks of alcohol     Comment: social    Drug use: No   Other Topics Concern    Caffeine Concern No    Stress Concern No    Weight Concern No    Special Diet No    Exercise No    Seat Belt Yes     Family History:  Family History   Problem Relation Age of Onset    Cancer Father         prostate ca    Diabetes Maternal Grandmother     Other (Other) Sister         thyroid     Allergies:  No Known Allergies  Current Meds:  Current Outpatient Medications on File Prior to Visit   Medication Sig Dispense Refill    cefadroxil 500 MG Oral Cap Take 1 capsule (500 mg total) by mouth 2 (two) times daily. 20 capsule 0    methylPREDNISolone (MEDROL) 4 MG Oral Tablet Therapy Pack As directed. 1 each 0    buPROPion  MG Oral Tablet 12 Hr Take 1 tablet (150 mg total) by mouth 2 (two) times daily. (Patient not taking: Reported on 2024) 180 tablet 0    montelukast 10 MG Oral Tab Take 1 tablet (10 mg total) by mouth nightly.       No current facility-administered medications on file prior to visit.         REVIEW OF SYSTEMS   Constitutional: no fatigue normal energy no weight changes   HENT: as above   Eyes: . normal vision no eye pain   Respiratory: normal respirations no cough   Cardiovascular: no CP, or palpitations   Gastrointestinal: normal bowels and no abd pains   Genitourinary:  normal urination no hematuria, no frequency   Musculoskeletal: no pains in arms/legs, normal range of motion   Skin: no rashes or skin lesions that are new   Neurological:  no weakness, no numbness, normal gait   Hematological:  no bruises or bleeding   Psychiatric/Behavioral: normal mood no anxiety normal behavior       PHYSICAL EXAM:   Constitutional: Vital signs reviewed as noted, well developed, in no acute distress.   HENT: NCAT, pharynx is  erythematous  Cardiovascular: nl s1 s2 no m/r/g  Pulmonary/Chest: CTA bilaterally with no wheezes  Extremities: no pedal edema   Neurological:  no weakness in UE and LE, reflexes are normal  Skin: no rashes or bruises on visualized skin, not undressed   Psychiatric:normal mood

## 2024-06-10 NOTE — PATIENT INSTRUCTIONS
Start Augmentin twice a day for 10 days.  The antibiotic may cause some diarrhea.    Start salt water gargles up to twice a day and you can take ibuprofen 400 to 600 mg up to 3 times a day with food for no more than 10 days.    If symptoms worsen in any way please let me know or go to the Urgent care     Work note sent through [facility]

## 2024-07-03 ENCOUNTER — OFFICE VISIT (OUTPATIENT)
Dept: INTERNAL MEDICINE CLINIC | Facility: CLINIC | Age: 39
End: 2024-07-03
Payer: COMMERCIAL

## 2024-07-03 VITALS
HEART RATE: 76 BPM | HEIGHT: 73 IN | SYSTOLIC BLOOD PRESSURE: 128 MMHG | WEIGHT: 192.38 LBS | OXYGEN SATURATION: 98 % | TEMPERATURE: 98 F | DIASTOLIC BLOOD PRESSURE: 76 MMHG | BODY MASS INDEX: 25.5 KG/M2 | RESPIRATION RATE: 16 BRPM
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
## 2024-07-03 PROCEDURE — 99213 OFFICE O/P EST LOW 20 MIN: CPT | Performed by: FAMILY MEDICINE

## 2024-07-03 RX ORDER — CEPHALEXIN 500 MG/1
500 CAPSULE ORAL 2 TIMES DAILY
Qty: 20 CAPSULE | Refills: 0 | Status: SHIPPED | OUTPATIENT
Start: 2024-07-03

## 2024-07-03 RX ORDER — METHYLPREDNISOLONE 4 MG/1
TABLET ORAL
Qty: 1 EACH | Refills: 0 | Status: SHIPPED | OUTPATIENT
Start: 2024-07-03

## 2024-07-03 NOTE — PROGRESS NOTES
CHIEF COMPLAINT:     Chief Complaint   Patient presents with    Bite Sting,Insect       HPI:   Jericho Guillory Jr. is a 39 year old male .  The patient complains of a probable insect bite. Noticed the bite over the weekend. Pt was outside and felt something bite him. The bite is located on the right inner thigh. The bite is sore and a little itchy. Has had had a similar bites in the past.    Current Outpatient Medications   Medication Sig Dispense Refill    montelukast 10 MG Oral Tab Take 1 tablet (10 mg total) by mouth nightly.        No past medical history on file.   Social History:  Social History     Socioeconomic History    Marital status:    Tobacco Use    Smoking status: Every Day     Current packs/day: 0.00     Types: Cigarettes     Last attempt to quit: 10/7/2017     Years since quittin.7    Smokeless tobacco: Never    Tobacco comments:     1/2 pack daily   Vaping Use    Vaping status: Never Used   Substance and Sexual Activity    Alcohol use: Yes     Alcohol/week: 0.0 standard drinks of alcohol     Comment: social    Drug use: No   Other Topics Concern    Caffeine Concern No    Stress Concern No    Weight Concern No    Special Diet No    Exercise No    Seat Belt Yes        REVIEW OF SYSTEMS:   GENERAL: Denies fever, chills,weight change, decreased appetite  SKIN: see HPI  EYES: Denies blurred vision or double vision  HENT: Denies congestion, rhinorrhea, sore throat or ear pain  CHEST: Denies chest pain, or palpitations  LUNGS: Denies shortness of breath, cough, or wheezing  GI: Denies abdominal pain, N/V/C/D.   MUSCULOSKELETAL: no arthralgia or swollen joints  LYMPH:  Denies lymphadenopathy  NEURO: Denies headaches or lightheadedness      EXAM:   /76 (BP Location: Left arm, Patient Position: Sitting, Cuff Size: adult)   Pulse 76   Temp 97.9 °F (36.6 °C) (Temporal)   Resp 16   Ht 6' 1\" (1.854 m)   Wt 192 lb 6.4 oz (87.3 kg)   SpO2 98%   BMI 25.38 kg/m²   GENERAL: well developed, well  nourished,in no apparent distress  SKIN: Right inner thigh- dime sized red raised papula noted with surrounding redness. + warm and sore to touch.  HEAD: atraumatic, normocephalic  EYES: conjunctiva clear  LUNGS: clear to auscultation bilaterally, no wheezes or rhonchi. Breathing is non labored.  CARDIO: RRR without murmur    Physical Exam    ASSESSMENT AND PLAN:     Encounter Diagnosis   Name Primary?    Bug bite with infection, initial encounter Yes       No orders of the defined types were placed in this encounter.      Meds & Refills for this Visit:  Requested Prescriptions     Signed Prescriptions Disp Refills    cephalexin (KEFLEX) 500 MG Oral Cap 20 capsule 0     Sig: Take 1 capsule (500 mg total) by mouth 2 (two) times daily.       Imaging & Consults:  None    PLAN:  Skin care discussed with patient.  Benadryl or zyrtec for the itching. Pt to watch the area or increase in redness,pain ,drg. Medications as above.   The patient indicates understanding of these issues and agrees to the plan.  The patient is asked to return if not improving.

## 2024-08-07 ENCOUNTER — TELEPHONE (OUTPATIENT)
Dept: FAMILY MEDICINE CLINIC | Facility: CLINIC | Age: 39
End: 2024-08-07

## 2024-08-07 ENCOUNTER — OFFICE VISIT (OUTPATIENT)
Facility: LOCATION | Age: 39
End: 2024-08-07
Payer: COMMERCIAL

## 2024-08-07 VITALS — DIASTOLIC BLOOD PRESSURE: 70 MMHG | SYSTOLIC BLOOD PRESSURE: 124 MMHG

## 2024-08-07 DIAGNOSIS — B35.1 ONYCHOMYCOSIS: ICD-10-CM

## 2024-08-07 DIAGNOSIS — S99.922A INJURY OF TOENAIL OF LEFT FOOT, INITIAL ENCOUNTER: Primary | ICD-10-CM

## 2024-08-07 DIAGNOSIS — B35.1 ONYCHOMYCOSIS: Primary | ICD-10-CM

## 2024-08-07 DIAGNOSIS — M79.675 PAIN OF LEFT GREAT TOE: ICD-10-CM

## 2024-08-07 DIAGNOSIS — L60.3 NAIL DYSTROPHY: ICD-10-CM

## 2024-08-07 PROCEDURE — 99203 OFFICE O/P NEW LOW 30 MIN: CPT | Performed by: PODIATRIST

## 2024-08-07 PROCEDURE — 87101 SKIN FUNGI CULTURE: CPT | Performed by: PODIATRIST

## 2024-08-07 PROCEDURE — 11730 AVULSION NAIL PLATE SIMPLE 1: CPT | Performed by: PODIATRIST

## 2024-08-07 NOTE — PROGRESS NOTES
[facility]  Progress Note    Jericho Guillory Jr. is a 39 year old male.   Chief Complaint   Patient presents with    New Patient     Patient is here for left foot hallux nail. Patient states that it got caught on a blanket this morning and now the nail is lifted from the bed. Rates discomfort 2/10, there is some tingling. Patient would like to treat toenail fungus, bilateral foot. He would like to try oral medication.         HPI:     Patient is a pleasant 39-year-old male who is presenting to clinic today with complaints of a left great toenail injury.  Patient states that his toenail got caught on a blanket this morning and the nail was lifted from the nailbed.  Has noticed some bloody drainage.  Rates pain 2/10.  Patient is hoping to discuss treatment options.  Patient has had issues in the past with this toenail and is inquiring if the toenail is fungal in nature and would like to discuss options.  Denies noticing signs of infection.  No other concerns.  Denies recent nausea, vomiting, fevers, chills      Allergies: Patient has no known allergies.   Current Outpatient Medications   Medication Sig Dispense Refill    montelukast 10 MG Oral Tab Take 1 tablet (10 mg total) by mouth nightly.        No past medical history on file.   No past surgical history on file.   Family History   Problem Relation Age of Onset    Cancer Father         prostate ca    Diabetes Maternal Grandmother     Other (Other) Sister         thyroid      Social History     Socioeconomic History    Marital status:    Tobacco Use    Smoking status: Every Day     Current packs/day: 0.00     Types: Cigarettes     Last attempt to quit: 10/7/2017     Years since quittin.8    Smokeless tobacco: Never    Tobacco comments:     1/2 pack daily   Vaping Use    Vaping status: Never Used   Substance and Sexual Activity    Alcohol use: Yes     Alcohol/week: 0.0 standard drinks of alcohol     Comment: social    Drug use: No   Other  Topics Concern    Caffeine Concern No    Stress Concern No    Weight Concern No    Special Diet No    Exercise No    Seat Belt Yes           REVIEW OF SYSTEMS:     10 point ROS completed and was negative, except for pertinent positive and negatives stated in subjective.       EXAM:     GENERAL: well developed, well nourished, in no apparent distress  EXTREMITIES:  1. Integument: Left hallux toenail is about 75% lysed from the nailbed, and remains attached at the proximal nail fold.  There is some subungual sanguinous drainage but no current signs of infection.  The toenail itself is thickened, elongated, discolored, dystrophic.  Skin appears moist, warm, and supple with positive hair growth. There are no color changes. No open lesions. No macerations. No Hyperkeratotic lesions.   2. Vascular: Dorsalis pedis 2/4 bilateral and posterior tibial pulses 2/4 bilateral, capillary refill normal.  3. Neurological: Gross sensation intact via light touch bilaterally.  Normal sharp/dull sensation  4. Musculoskeletal: All muscle groups are graded 5/5 in the foot and ankle.       ASSESSMENT AND PLAN:   Diagnoses and all orders for this visit:    Injury of toenail of left foot, initial encounter  -     REMOVAL OF NAIL PLATE    Nail dystrophy  -     Fungus Hair, Skin, Nail Culture (Dermatophyte); Future    Onychomycosis  -     Fungus Hair, Skin, Nail Culture (Dermatophyte); Future    Pain of left great toe        Plan:   -Patient was seen and evaluated today in clinic.  Chart history reviewed.    -Discussed treatment options.  Would recommend total nail avulsion of the left hallux toenail today.  Patient is agreeable to this.  The procedure was discussed in detail and consent was signed.      The nail plate will then be sent for evaluation of fungus.  If the fungal culture is positive, patient would like to move forward with oral Lamisil.  I did discuss with patient that we would obtain liver function testing prior to prescribing.   Patient was agreeable to the plan.  Will have staff reach out to patient with fungal culture results.    Procedure: Total nail avulsion of left hallux toenail (CPT: 97986)  After prepping site with betadine, administered local infiltrative block to left hallux consisting of 5 cc of 1:1 mixture of 0.5% Marcaine plain and 1% lidocaine plain.  After sufficient anesthesia was achieved, the digit was prepped with Betadine.  Next, using a hemostat, bilateral borders and proximal nail fold of the left hallux nail were freed.  A hemostat was once again used to remove the nail in its entirety.  The site was then copiously irrigated with saline and patted dry.  The site was dressed with bacitracin, Adaptic, gauze, and mildly compressive Coban wrap.  The patient tolerated the procedure well and there were no complications.      -All soaking and aftercare instructions were discussed with the patient.  Informational handout was dispensed.    -Educated patient on acute signs of infection advised patient to seek immediate medical attention if any concerns arise.    -The patient indicates understanding of these issues and agrees to the plan.    Time spent reviewing pertinent information from patient's chart, reviewing any pertinent imaging, obtaining history and physical exam, discussing and mutually agreeing on a treatment plan, and documenting encounter: 30 minutes    RTC 2 weeks      Gilmar Boston DPM        8/7/2024    Dragon speech recognition software was used to prepare this note.  Errors in word recognition may occur.  Please contact me with any questions/concerns with this note.

## 2024-08-07 NOTE — TELEPHONE ENCOUNTER
Pt requesting referral for the below specialist:    Specialty: Podiatry   Provider/Specialist: Dr. Gilmar Boston   Address:[address] [address] [address]  #  Fax#    Reason/Symptoms: ongoing issues with toenail and today it is lifting from nail bed.     Has pt seen PCP for this condition? (Y/N): Y     Insurance: [employer]     Appointrment today at 2:45  with Dr. Boston

## 2024-08-07 NOTE — PROCEDURES
Per verbal order from Dr Boston, draw up 2.5mls of 1% lidocaine and 2.5mls of Marcaine 0.5%, for total nail avulsion of the left hallux.

## 2024-08-12 ENCOUNTER — TELEPHONE (OUTPATIENT)
Dept: ORTHOPEDICS CLINIC | Facility: CLINIC | Age: 39
End: 2024-08-12

## 2024-08-12 DIAGNOSIS — B35.1 ONYCHOMYCOSIS: Primary | ICD-10-CM

## 2024-08-12 NOTE — TELEPHONE ENCOUNTER
Left message to call back.      Bao Boston DPM  [facility] Clinical Staff  Results reviewed.  Please inform patient that fungal culture results are positive and we can proceed with Lamisil tablet therapy as discussed.  If the patient agrees we have to do a hepatic function panel, please place that order for me with a diagnosis of onychomycosis.  Thank you

## 2024-08-19 NOTE — TELEPHONE ENCOUNTER
Called patient and verified name and . Informed him of results and Dr Boston recommendations. Patient states he has had issues with elevated LFTs in the past but he had and US and everything was normal. I advised if labs come back high Dr Boston would most likely want to confirm with pcp prior to treating with oral lamisil, but can be discussed further once labs come in. Patient verbalized understanding.

## 2024-08-23 ENCOUNTER — OFFICE VISIT (OUTPATIENT)
Facility: LOCATION | Age: 39
End: 2024-08-23
Payer: COMMERCIAL

## 2024-08-23 DIAGNOSIS — Z98.890 STATUS POST NAIL SURGERY: Primary | ICD-10-CM

## 2024-08-23 PROCEDURE — 99212 OFFICE O/P EST SF 10 MIN: CPT | Performed by: PODIATRIST

## 2024-09-01 NOTE — PROGRESS NOTES
[facility]  Progress Note    Jericho Guillory Jr. is a 39 year old male.   Chief Complaint   Patient presents with    Post-Op     Left hallux total nail avulsion completed at LOV 24.Patient denies any pain, drainage or fever.         HPI:     Patient is a pleasant 39-year-old male who is returning to clinic today for recheck after total nail avulsion of his left great toenail following an injury.  Patient states he is doing well overall.  Denying any pain, drainage, or recent signs of infection.  Patient's fungal culture did come back positive for fungus.  Has not yet gotten updated LFTs to start oral Lamisil.  No other concerns at this time.  Denies recent nausea, vomiting, fevers, chills.      Allergies: Patient has no known allergies.   Current Outpatient Medications   Medication Sig Dispense Refill    montelukast 10 MG Oral Tab Take 1 tablet (10 mg total) by mouth nightly.        No past medical history on file.   No past surgical history on file.   Family History   Problem Relation Age of Onset    Cancer Father         prostate ca    Diabetes Maternal Grandmother     Other (Other) Sister         thyroid      Social History     Socioeconomic History    Marital status:    Tobacco Use    Smoking status: Every Day     Current packs/day: 0.00     Types: Cigarettes     Last attempt to quit: 10/7/2017     Years since quittin.9    Smokeless tobacco: Never    Tobacco comments:     1/2 pack daily   Vaping Use    Vaping status: Never Used   Substance and Sexual Activity    Alcohol use: Yes     Alcohol/week: 0.0 standard drinks of alcohol     Comment: social    Drug use: No   Other Topics Concern    Caffeine Concern No    Stress Concern No    Weight Concern No    Special Diet No    Exercise No    Seat Belt Yes           REVIEW OF SYSTEMS:     10 point ROS completed and was negative, except for pertinent positive and negatives stated in subjective.       EXAM:     GENERAL: well developed, well  nourished, in no apparent distress  EXTREMITIES:  1. Integument: Status post total nail avulsion of left hallux.  No surrounding erythema, purulent drainage, or other signs of infection.  Site itself is overall stable.  Skin appears moist, warm, and supple with positive hair growth. There are no color changes. No open lesions. No macerations. No Hyperkeratotic lesions.   2. Vascular: Dorsalis pedis 2/4 and posterior tibial pulse 2/4, capillary refill normal.  3. Neurological: Gross sensation intact via light touch bilaterally.  Normal sharp/dull sensation  4. Musculoskeletal: No discomfort with palpation to left hallux nail bed.  Remainder of exam deferred     Fungus HSN Culture Result Scedosporium prolificans Abnormal          Please disregard previous corrected report. Lab clerical error. Mold is present. Identification to follow.          Resulting Agency: [facility] ([facility])           Specimen Collected: 08/07/24  3:10 PM Last Resulted: 08/20/24  2:16 PM          ASSESSMENT AND PLAN:   Diagnoses and all orders for this visit:    Status post nail surgery        Plan:   -Patient was seen and evaluated today in clinic.  Chart history reviewed.    Patient was seen and evaluated today in clinic.  They are status post total nail avulsion of left great toe without chemical matrixectomy.  No current signs of infection noted.    Patient to discontinue daily foot soaks, antibiotic cream, and Band-Aids at this time.  Okay for patient to utilize Band-Aid when in shoe gear for added protection.  Encouraged to allow site to air out.    Recommend patient monitor for any signs of infection and contact my office and seek immediate medical attention if noticing any of these signs.    Patient's toenail fungal culture was positive for fungus.  See above.  Patient is wanting to move forward with oral Lamisil.  Order is active for updated liver function test.  Pending those results, we will discuss moving forward with oral  Lamisil.    All of patient's questions were addressed and they will contact the office with any concerns in the future.    -The patient indicates understanding of these issues and agrees to the plan.    Time spent reviewing pertinent information from patient's chart, reviewing any pertinent imaging, obtaining history and physical exam, discussing and mutually agreeing on a treatment plan, and documenting encounter: 10 minutes    RTC 6 weeks from when starting oral Lamisil      Gilmar Boston DPM        [address] [address]     9/1/2024    Dragon speech recognition software was used to prepare this note.  Errors in word recognition may occur.  Please contact me with any questions/concerns with this note.

## 2024-09-23 ENCOUNTER — TELEPHONE (OUTPATIENT)
Dept: PODIATRY CLINIC | Facility: CLINIC | Age: 39
End: 2024-09-23

## 2024-09-23 ENCOUNTER — LAB ENCOUNTER (OUTPATIENT)
Dept: LAB | Age: 39
End: 2024-09-23
Attending: FAMILY MEDICINE
Payer: COMMERCIAL

## 2024-09-23 DIAGNOSIS — B35.1 ONYCHOMYCOSIS: ICD-10-CM

## 2024-09-23 DIAGNOSIS — Z11.3 SCREEN FOR STD (SEXUALLY TRANSMITTED DISEASE): ICD-10-CM

## 2024-09-23 LAB
ALBUMIN SERPL-MCNC: 4.5 G/DL (ref 3.2–4.8)
ALP LIVER SERPL-CCNC: 104 U/L
ALT SERPL-CCNC: 56 U/L
AST SERPL-CCNC: 32 U/L (ref ?–34)
BILIRUB DIRECT SERPL-MCNC: 0.2 MG/DL (ref ?–0.3)
BILIRUB SERPL-MCNC: 0.7 MG/DL (ref 0.3–1.2)
HBV SURFACE AB SER QL: REACTIVE
HBV SURFACE AB SERPL IA-ACNC: 651.94 MIU/ML
HBV SURFACE AG SER-ACNC: <0.1 [IU]/L
HBV SURFACE AG SERPL QL IA: NONREACTIVE
HCV AB SERPL QL IA: NONREACTIVE
PROT SERPL-MCNC: 7.2 G/DL (ref 5.7–8.2)
T PALLIDUM AB SER QL IA: NONREACTIVE

## 2024-09-23 PROCEDURE — 87340 HEPATITIS B SURFACE AG IA: CPT

## 2024-09-23 PROCEDURE — 87389 HIV-1 AG W/HIV-1&-2 AB AG IA: CPT

## 2024-09-23 PROCEDURE — 86803 HEPATITIS C AB TEST: CPT

## 2024-09-23 PROCEDURE — 36415 COLL VENOUS BLD VENIPUNCTURE: CPT

## 2024-09-23 PROCEDURE — 80076 HEPATIC FUNCTION PANEL: CPT

## 2024-09-23 PROCEDURE — 87591 N.GONORRHOEAE DNA AMP PROB: CPT

## 2024-09-23 PROCEDURE — 87801 DETECT AGNT MULT DNA AMPLI: CPT

## 2024-09-23 PROCEDURE — 86706 HEP B SURFACE ANTIBODY: CPT

## 2024-09-23 PROCEDURE — 86780 TREPONEMA PALLIDUM: CPT

## 2024-09-23 PROCEDURE — 87491 CHLMYD TRACH DNA AMP PROBE: CPT

## 2024-09-23 NOTE — TELEPHONE ENCOUNTER
Patient states that he had lab tests done and wants to know if he is able to start taking a new medication?

## 2024-09-23 NOTE — TELEPHONE ENCOUNTER
Per Dr Dr Boston-  \"Hepatic function panel reviewed, revealing elevated ALT.  Because of this, recommend against oral Lamisil at this time.  Patient should follow-up with PCP in regards to elevated lab marker.  We can offer patient topical antifungal such as ciclopirox.  He can also try home remedies such as soaking feet daily with warm water and apple cider vinegar, as well as Vicks vapor rub for toenail thickness.  Please let me know if patient has any further questions.  Thank you\"

## 2024-09-24 LAB
C TRACH DNA SPEC QL NAA+PROBE: NEGATIVE
N GONORRHOEA DNA SPEC QL NAA+PROBE: NEGATIVE

## 2024-09-24 NOTE — TELEPHONE ENCOUNTER
Called patient and verified name and  and informed him per results and Dr Boston recommendations. Patient would like information sent through [employer] Message sent.

## 2024-10-03 ENCOUNTER — OFFICE VISIT (OUTPATIENT)
Dept: FAMILY MEDICINE CLINIC | Facility: CLINIC | Age: 39
End: 2024-10-03
Payer: COMMERCIAL

## 2024-10-03 VITALS
TEMPERATURE: 98 F | HEIGHT: 73 IN | RESPIRATION RATE: 16 BRPM | BODY MASS INDEX: 25.58 KG/M2 | HEART RATE: 94 BPM | SYSTOLIC BLOOD PRESSURE: 130 MMHG | WEIGHT: 193 LBS | DIASTOLIC BLOOD PRESSURE: 84 MMHG

## 2024-10-03 DIAGNOSIS — J30.2 SEASONAL ALLERGIES: Primary | ICD-10-CM

## 2024-10-03 PROCEDURE — 99214 OFFICE O/P EST MOD 30 MIN: CPT | Performed by: FAMILY MEDICINE

## 2024-10-03 PROCEDURE — 96372 THER/PROPH/DIAG INJ SC/IM: CPT | Performed by: FAMILY MEDICINE

## 2024-10-03 RX ORDER — PREDNISONE 20 MG/1
TABLET ORAL
Qty: 20 TABLET | Refills: 0 | Status: SHIPPED | OUTPATIENT
Start: 2024-10-03

## 2024-10-03 RX ORDER — METHYLPREDNISOLONE SODIUM SUCCINATE 125 MG/2ML
125 INJECTION, POWDER, LYOPHILIZED, FOR SOLUTION INTRAMUSCULAR; INTRAVENOUS ONCE
Status: COMPLETED | OUTPATIENT
Start: 2024-10-03 | End: 2024-10-03

## 2024-10-03 RX ADMIN — METHYLPREDNISOLONE SODIUM SUCCINATE 125 MG: 125 INJECTION, POWDER, LYOPHILIZED, FOR SOLUTION INTRAMUSCULAR; INTRAVENOUS at 17:05:00

## 2024-10-04 NOTE — PROGRESS NOTES
[facility] Office Note  Chief Complaint:   Chief Complaint   Patient presents with    Allergies     Allergies, raspy/dry throat, congestion, and slight cough. Symptoms are worst in the morning.        HPI:   This is a 39 year old male coming in for persistent seasonal allergies.  Patient complains of nasal congestion along with runny nose.  He has been having a raspy and dry throat.  He has had a slight cough.  Symptoms typically worse in the morning.  He is taking Singulair and over-the-counter allergy medication daily.  He is here today to receive an injection of Solu-Medrol as this has helped to reduce his symptoms in the past.      History reviewed. No pertinent past medical history.  History reviewed. No pertinent surgical history.  Social History:  Social History     Socioeconomic History    Marital status:    Tobacco Use    Smoking status: Every Day     Current packs/day: 0.00     Types: Cigarettes     Last attempt to quit: 10/7/2017     Years since quittin.9    Smokeless tobacco: Never    Tobacco comments:     1/2 pack daily   Vaping Use    Vaping status: Never Used   Substance and Sexual Activity    Alcohol use: Yes     Alcohol/week: 0.0 standard drinks of alcohol     Comment: social    Drug use: No   Other Topics Concern    Caffeine Concern No    Stress Concern No    Weight Concern No    Special Diet No    Exercise No    Seat Belt Yes     Family History:  Family History   Problem Relation Age of Onset    Cancer Father         prostate ca    Diabetes Maternal Grandmother     Other (Other) Sister         thyroid     Allergies:  No Known Allergies  Current Meds:  Current Outpatient Medications   Medication Sig Dispense Refill    predniSONE 20 MG Oral Tab 3 tabs PO daily x 3d, 2 tabs PO daily x 3d, 1 tab PO daily x 3d, 1/2 tab PO daily x 3d 20 tablet 0    montelukast 10 MG Oral Tab Take 1 tablet (10 mg total) by mouth nightly.        Ready to quit: Not Answered  Counseling  given: Not Answered  Tobacco comments: 1/2 pack daily       REVIEW OF SYSTEMS:   ROS:  CONSTITUTIONAL:  Denies any unusual weight gain/loss, fever, chills, weakness or fatigue.  HEENT: + nasal congestion  CARDIOVASCULAR:  Denies chest pain, chest pressure or chest discomfort. No palpitations or edema.  Denies any dyspnea on exertion or at rest  RESPIRATORY:  Denies shortness of breath, cough  GASTROINTESTINAL:  Denies any abdominal pain, nausea, vomiting  NEUROLOGICAL:  Denies headache, dizziness, syncope, numbness or tingling in the extremities.  MUSCULOSKELETAL:  Denies muscle, back pain, joint pain or stiffness.  ALLERGIES:  Denies history of asthma, hives, eczema, + rhinitis.    EXAM:   /84   Pulse 94   Temp 97.5 °F (36.4 °C) (Temporal)   Resp 16   Ht 6' 1\" (1.854 m)   Wt 193 lb (87.5 kg)   BMI 25.46 kg/m²  Estimated body mass index is 25.46 kg/m² as calculated from the following:    Height as of this encounter: 6' 1\" (1.854 m).    Weight as of this encounter: 193 lb (87.5 kg).   Vital signs reviewed.Appears stated age, well groomed.  Physical Exam:  GEN:  Patient is alert and oriented x3, no apparent distress  HEAD:  Normocephalic, atraumatic  HEENT:  Eyes: EOMI, PERRLA, no scleral icterus, conjunctivae clear bilaterally.  Ears: TM's clear and visible bilaterally, no excess cerumen or erythema.  Throat:  No tonsillar erythema or exudate.  Mouth:  No oral lesions or ulcerations, no dental abnormalities noted.  LUNGS: clear to auscultation bilaterally, no rales/rhonchi/wheezing  HEART:  Regular rate and rhythm, no murmurs, rubs or gallops  ABDOMEN:  Soft, nondistended, nontender, bowel sounds normal in all 4 quadrants, no hepatosplenomegaly  EXTREMITIES:  Strength intact with 5/5 bilaterally upper and lower extremities, no edema noted  NEURO:  CN 2 - 12 grossly intact     ASSESSMENT AND PLAN:   1. Seasonal allergies  -  solumedrol 125mg IM x 1 given  -  start prednisone taper next day  -  continue  singulair and OTC zyrtec once daily  -  will refer to allergist if no improvement  - predniSONE 20 MG Oral Tab; 3 tabs PO daily x 3d, 2 tabs PO daily x 3d, 1 tab PO daily x 3d, 1/2 tab PO daily x 3d  Dispense: 20 tablet; Refill: 0  - methylPREDNISolone sodium succinate (Solu-MEDROL) injection 125 mg      Meds & Refills for this Visit:  Requested Prescriptions     Signed Prescriptions Disp Refills    predniSONE 20 MG Oral Tab 20 tablet 0     Sig: 3 tabs PO daily x 3d, 2 tabs PO daily x 3d, 1 tab PO daily x 3d, 1/2 tab PO daily x 3d       Health Maintenance:  Health Maintenance Due   Topic Date Due    Pneumococcal Vaccine: Birth to 64yrs (1 of 2 - PCV) Never done    Tobacco Cessation Counseling  Never done    COVID-19 Vaccine (3 - 2023-24 season) 09/01/2024    Influenza Vaccine (1) 10/01/2024       Patient/Caregiver Education: Patient/Caregiver Education: There are no barriers to learning. Medical education done.   Outcome: Patient verbalizes understanding. Patient is notified to call with any questions, complications, allergies, or worsening or changing symptoms.  Patient is to call with any side effects or complications from the treatments as a result of today.     Problem List:  Patient Active Problem List   Diagnosis    Lipoma of back    Lipoma of forearm    Submuscular lipoma of chest    Tobacco abuse    Rhytides       KEYANA WHITNEY, DO    Please note that portions of this note may have been completed with a voice recognition program. Efforts were made to edit the dictations but occasionally words are mis-transcribed.

## 2025-01-20 ENCOUNTER — OFFICE VISIT (OUTPATIENT)
Dept: INTERNAL MEDICINE CLINIC | Facility: CLINIC | Age: 40
End: 2025-01-20
Payer: COMMERCIAL

## 2025-01-20 VITALS
BODY MASS INDEX: 27.63 KG/M2 | SYSTOLIC BLOOD PRESSURE: 145 MMHG | DIASTOLIC BLOOD PRESSURE: 96 MMHG | HEIGHT: 71 IN | HEART RATE: 83 BPM | RESPIRATION RATE: 16 BRPM | WEIGHT: 197.38 LBS | OXYGEN SATURATION: 100 % | TEMPERATURE: 98 F

## 2025-01-20 DIAGNOSIS — J22 ACUTE RESPIRATORY INFECTION: Primary | ICD-10-CM

## 2025-01-20 PROCEDURE — 99213 OFFICE O/P EST LOW 20 MIN: CPT | Performed by: INTERNAL MEDICINE

## 2025-01-20 RX ORDER — AZELASTINE 1 MG/ML
1 SPRAY, METERED NASAL 2 TIMES DAILY
Qty: 30 ML | Refills: 0 | Status: SHIPPED | OUTPATIENT
Start: 2025-01-20

## 2025-01-20 RX ORDER — BENZONATATE 100 MG/1
100 CAPSULE ORAL 3 TIMES DAILY PRN
Qty: 30 CAPSULE | Refills: 0 | Status: SHIPPED | OUTPATIENT
Start: 2025-01-20

## 2025-01-20 NOTE — PROGRESS NOTES
Jericho Guillory Jr. is a 39 year old male.   HPI:     Chief Complaint   Patient presents with    Upper Respiratory Infection     For 3 days.      Patient presents with acute URI symptoms.  He has been dealing with a lot nasal congestion, ear pressure, scratchy throat, slight cough.  No fevers/chills.   had some left over doxycyline - patient has been taking this BID for past 3 days.    Past medical, family, surgical and social history were reviewed as listed in the chart, and are unchanged from previous visit.  REVIEW OF SYSTEMS:   GENERAL/ const: no fevers/chills, no unintentional weight loss  EYES:no vision problems  HEENT: nasal congestion, occasional ear pain; denies sinus pain or sinus tenderness  LUNGS: denies shortness of breath   CARDIOVASCULAR: denies chest pain  GI: denies nausea/emesis/ abdominal pain diarrhea constipation  : denies dysuria   MUSCULOSKELETAL: no acute arthralgias  NEURO: denies headaches  ALLERGY: Allergies[1]  PAST HISTORY:     Current Outpatient Medications:     montelukast 10 MG Oral Tab, Take 1 tablet (10 mg total) by mouth nightly., Disp: , Rfl:   Medical:  has no past medical history on file.  Surgical:  has no past surgical history on file.  Family: family history includes Cancer in his father; Diabetes in his maternal grandmother; Other in his sister.  Social:  reports that he has been smoking cigarettes. He has never used smokeless tobacco. He reports current alcohol use. He reports that he does not use drugs.  Wt Readings from Last 6 Encounters:   01/20/25 197 lb 6.4 oz (89.5 kg)   10/03/24 193 lb (87.5 kg)   07/03/24 192 lb 6.4 oz (87.3 kg)   05/07/24 193 lb (87.5 kg)   03/21/24 196 lb (88.9 kg)   02/01/24 191 lb (86.6 kg)     EXAM:   BP (!) 145/96 (BP Location: Left arm, Patient Position: Sitting, Cuff Size: large)   Pulse 83   Temp 98 °F (36.7 °C) (Temporal)   Resp 16   Ht 5' 11\" (1.803 m)   Wt 197 lb 6.4 oz (89.5 kg)   SpO2 100%   BMI 27.53 kg/m²   GENERAL:  Alert and oriented, well developed, well nourished,in no apparent distress  HEENT: atraumatic, PERRLA, EOMI, normal lid and conjunctiva, normal external ear canals, normal right TM, some congestion behind left TM, no sinus tenderness, normal pharynx  LUNGS: clear to auscultation bilaterally, no wheezing/rubs  CARDIO: RRR without murmurs.  No clubbing, cyanosis or edema.  GI: soft non tender nondistended no hepatosplenomegaly, bowel sounds throughout  NEURO: CN II-XII intact, 5/5 strength all extremities  PSYCH: pleasant, appropriate mood and affect  ASSESSMENT AND PLAN:   1. Acute respiratory infection  Patient with nasal congestion, throat irritation, occasional ear pain/pressure for past 3 days.  Intermittent cough.  Lungs clear to auscultation today.  Normal pharynx,  no otitis media/externa signs on examination.  No sinus tenderness.  Likely viral URI.  He has been taking his 's left over doxycyline for 3 days with no improvement.  Will start on azelastine nasal spray, benzonatate capsules instead.  Advised to start Augmentin if symptoms not better by the end of the week.  - azelastine 0.1 % Nasal Solution; 1 spray by Nasal route 2 (two) times daily.  Dispense: 30 mL; Refill: 0  - benzonatate 100 MG Oral Cap; Take 1 capsule (100 mg total) by mouth 3 (three) times daily as needed.  Dispense: 30 capsule; Refill: 0  - amoxicillin clavulanate 875-125 MG Oral Tab; Take 1 tablet by mouth 2 (two) times daily for 7 days.  Dispense: 14 tablet; Refill: 0    Patient Care Team:  Pedro Merino DO as PCP - General (Family Medicine)  Erika Ravi DPM (PODIATRIST)  The patient indicates understanding of these issues and agrees to the plan.  The patient is asked to return to clinic as needed/scheduled/due with Dr. Merino.    Debbi Duval MD           [1] No Known Allergies

## 2025-01-26 ENCOUNTER — HOSPITAL ENCOUNTER (EMERGENCY)
Facility: HOSPITAL | Age: 40
Discharge: HOME | End: 2025-01-26
Attending: EMERGENCY MEDICINE
Payer: COMMERCIAL

## 2025-01-26 ENCOUNTER — OFFICE VISIT (OUTPATIENT)
Dept: FAMILY MEDICINE CLINIC | Facility: CLINIC | Age: 40
End: 2025-01-26
Payer: COMMERCIAL

## 2025-01-26 ENCOUNTER — APPOINTMENT (OUTPATIENT)
Dept: GENERAL RADIOLOGY | Facility: HOSPITAL | Age: 40
End: 2025-01-26
Attending: EMERGENCY MEDICINE
Payer: COMMERCIAL

## 2025-01-26 ENCOUNTER — APPOINTMENT (OUTPATIENT)
Dept: CT IMAGING | Facility: HOSPITAL | Age: 40
End: 2025-01-26
Attending: EMERGENCY MEDICINE
Payer: COMMERCIAL

## 2025-01-26 ENCOUNTER — APPOINTMENT (OUTPATIENT)
Dept: ULTRASOUND IMAGING | Facility: HOSPITAL | Age: 40
End: 2025-01-26
Attending: EMERGENCY MEDICINE
Payer: COMMERCIAL

## 2025-01-26 VITALS
HEART RATE: 84 BPM | OXYGEN SATURATION: 98 % | DIASTOLIC BLOOD PRESSURE: 92 MMHG | HEIGHT: 71 IN | SYSTOLIC BLOOD PRESSURE: 130 MMHG | RESPIRATION RATE: 20 BRPM | WEIGHT: 193 LBS | TEMPERATURE: 100 F | BODY MASS INDEX: 27.02 KG/M2

## 2025-01-26 VITALS
DIASTOLIC BLOOD PRESSURE: 88 MMHG | SYSTOLIC BLOOD PRESSURE: 133 MMHG | HEART RATE: 93 BPM | WEIGHT: 192 LBS | OXYGEN SATURATION: 98 % | BODY MASS INDEX: 26.88 KG/M2 | TEMPERATURE: 100 F | HEIGHT: 71 IN | RESPIRATION RATE: 18 BRPM

## 2025-01-26 DIAGNOSIS — I86.1 VARICOCELE: ICD-10-CM

## 2025-01-26 DIAGNOSIS — N50.811 PAIN IN BOTH TESTICLES: Primary | ICD-10-CM

## 2025-01-26 DIAGNOSIS — N41.0 ACUTE PROSTATITIS: Primary | ICD-10-CM

## 2025-01-26 DIAGNOSIS — R50.9 FEVER, UNSPECIFIED FEVER CAUSE: ICD-10-CM

## 2025-01-26 DIAGNOSIS — J06.9 VIRAL URI WITH COUGH: ICD-10-CM

## 2025-01-26 DIAGNOSIS — N50.3 EPIDIDYMAL CYST: ICD-10-CM

## 2025-01-26 DIAGNOSIS — N50.812 PAIN IN BOTH TESTICLES: Primary | ICD-10-CM

## 2025-01-26 DIAGNOSIS — N43.2 OTHER HYDROCELE: ICD-10-CM

## 2025-01-26 LAB
ALBUMIN SERPL-MCNC: 4.8 G/DL (ref 3.2–4.8)
ALBUMIN/GLOB SERPL: 1.7 {RATIO} (ref 1–2)
ALP LIVER SERPL-CCNC: 103 U/L
ALT SERPL-CCNC: 94 U/L
ANION GAP SERPL CALC-SCNC: 10 MMOL/L (ref 0–18)
AST SERPL-CCNC: 59 U/L (ref ?–34)
BASOPHILS # BLD AUTO: 0.02 X10(3) UL (ref 0–0.2)
BASOPHILS NFR BLD AUTO: 0.4 %
BILIRUB SERPL-MCNC: 0.7 MG/DL (ref 0.3–1.2)
BILIRUB UR QL STRIP.AUTO: NEGATIVE
BUN BLD-MCNC: 8 MG/DL (ref 9–23)
CALCIUM BLD-MCNC: 8.7 MG/DL (ref 8.7–10.6)
CHLORIDE SERPL-SCNC: 103 MMOL/L (ref 98–112)
CO2 SERPL-SCNC: 25 MMOL/L (ref 21–32)
COLOR UR AUTO: YELLOW
CREAT BLD-MCNC: 1.02 MG/DL
EGFRCR SERPLBLD CKD-EPI 2021: 97 ML/MIN/1.73M2 (ref 60–?)
EOSINOPHIL # BLD AUTO: 0.03 X10(3) UL (ref 0–0.7)
EOSINOPHIL NFR BLD AUTO: 0.6 %
ERYTHROCYTE [DISTWIDTH] IN BLOOD BY AUTOMATED COUNT: 13 %
FLUAV + FLUBV RNA SPEC NAA+PROBE: NEGATIVE
FLUAV + FLUBV RNA SPEC NAA+PROBE: NEGATIVE
GLOBULIN PLAS-MCNC: 2.9 G/DL (ref 2–3.5)
GLUCOSE BLD-MCNC: 87 MG/DL (ref 70–99)
GLUCOSE UR STRIP.AUTO-MCNC: NORMAL MG/DL
HCT VFR BLD AUTO: 52.4 %
HGB BLD-MCNC: 18.5 G/DL
IMM GRANULOCYTES # BLD AUTO: 0.03 X10(3) UL (ref 0–1)
IMM GRANULOCYTES NFR BLD: 0.6 %
KETONES UR STRIP.AUTO-MCNC: 10 MG/DL
LEUKOCYTE ESTERASE UR QL STRIP.AUTO: NEGATIVE
LIPASE SERPL-CCNC: 53 U/L (ref 12–53)
LYMPHOCYTES # BLD AUTO: 1.33 X10(3) UL (ref 1–4)
LYMPHOCYTES NFR BLD AUTO: 27 %
MCH RBC QN AUTO: 30.1 PG (ref 26–34)
MCHC RBC AUTO-ENTMCNC: 35.3 G/DL (ref 31–37)
MCV RBC AUTO: 85.3 FL
MONOCYTES # BLD AUTO: 0.59 X10(3) UL (ref 0.1–1)
MONOCYTES NFR BLD AUTO: 12 %
NEUTROPHILS # BLD AUTO: 2.92 X10 (3) UL (ref 1.5–7.7)
NEUTROPHILS # BLD AUTO: 2.92 X10(3) UL (ref 1.5–7.7)
NEUTROPHILS NFR BLD AUTO: 59.4 %
NITRITE UR QL STRIP.AUTO: NEGATIVE
OSMOLALITY SERPL CALC.SUM OF ELEC: 284 MOSM/KG (ref 275–295)
PH UR STRIP.AUTO: 5.5 [PH] (ref 5–8)
PLATELET # BLD AUTO: 163 10(3)UL (ref 150–450)
POTASSIUM SERPL-SCNC: 3.7 MMOL/L (ref 3.5–5.1)
PROT SERPL-MCNC: 7.7 G/DL (ref 5.7–8.2)
PROT UR STRIP.AUTO-MCNC: 30 MG/DL
RBC # BLD AUTO: 6.14 X10(6)UL
RSV RNA SPEC NAA+PROBE: NEGATIVE
SARS-COV-2 RNA RESP QL NAA+PROBE: NOT DETECTED
SODIUM SERPL-SCNC: 138 MMOL/L (ref 136–145)
SP GR UR STRIP.AUTO: 1.03 (ref 1–1.03)
UROBILINOGEN UR STRIP.AUTO-MCNC: NORMAL MG/DL
WBC # BLD AUTO: 4.9 X10(3) UL (ref 4–11)

## 2025-01-26 PROCEDURE — 87591 N.GONORRHOEAE DNA AMP PROB: CPT | Performed by: EMERGENCY MEDICINE

## 2025-01-26 PROCEDURE — 96374 THER/PROPH/DIAG INJ IV PUSH: CPT

## 2025-01-26 PROCEDURE — 76870 US EXAM SCROTUM: CPT | Performed by: EMERGENCY MEDICINE

## 2025-01-26 PROCEDURE — 87491 CHLMYD TRACH DNA AMP PROBE: CPT | Performed by: EMERGENCY MEDICINE

## 2025-01-26 PROCEDURE — 71045 X-RAY EXAM CHEST 1 VIEW: CPT | Performed by: EMERGENCY MEDICINE

## 2025-01-26 PROCEDURE — 96372 THER/PROPH/DIAG INJ SC/IM: CPT

## 2025-01-26 PROCEDURE — 99285 EMERGENCY DEPT VISIT HI MDM: CPT

## 2025-01-26 PROCEDURE — 96361 HYDRATE IV INFUSION ADD-ON: CPT

## 2025-01-26 PROCEDURE — 80053 COMPREHEN METABOLIC PANEL: CPT | Performed by: EMERGENCY MEDICINE

## 2025-01-26 PROCEDURE — 81001 URINALYSIS AUTO W/SCOPE: CPT | Performed by: EMERGENCY MEDICINE

## 2025-01-26 PROCEDURE — 85025 COMPLETE CBC W/AUTO DIFF WBC: CPT | Performed by: EMERGENCY MEDICINE

## 2025-01-26 PROCEDURE — 74176 CT ABD & PELVIS W/O CONTRAST: CPT | Performed by: EMERGENCY MEDICINE

## 2025-01-26 PROCEDURE — 83690 ASSAY OF LIPASE: CPT | Performed by: EMERGENCY MEDICINE

## 2025-01-26 PROCEDURE — 93975 VASCULAR STUDY: CPT | Performed by: EMERGENCY MEDICINE

## 2025-01-26 PROCEDURE — 0241U SARS-COV-2/FLU A AND B/RSV BY PCR (GENEXPERT): CPT | Performed by: EMERGENCY MEDICINE

## 2025-01-26 RX ORDER — DOXYCYCLINE 100 MG/1
100 CAPSULE ORAL 2 TIMES DAILY
Qty: 28 CAPSULE | Refills: 0 | Status: SHIPPED | OUTPATIENT
Start: 2025-01-26 | End: 2025-02-09

## 2025-01-26 RX ORDER — KETOROLAC TROMETHAMINE 15 MG/ML
15 INJECTION, SOLUTION INTRAMUSCULAR; INTRAVENOUS ONCE
Status: COMPLETED | OUTPATIENT
Start: 2025-01-26 | End: 2025-01-26

## 2025-01-26 RX ORDER — DOXYCYCLINE 100 MG/1
100 CAPSULE ORAL ONCE
Status: COMPLETED | OUTPATIENT
Start: 2025-01-26 | End: 2025-01-26

## 2025-01-26 RX ORDER — ACETAMINOPHEN 500 MG
1000 TABLET ORAL ONCE
Status: COMPLETED | OUTPATIENT
Start: 2025-01-26 | End: 2025-01-26

## 2025-01-26 NOTE — ED PROVIDER NOTES
Patient Seen in: [facility] Emergency Department      History     Chief Complaint   Patient presents with    Fever    Eval-G     Stated Complaint: testicle pain, fever, and back pain for 48 hours    Subjective:   39-year-old male, denies chronic past medical history, takes no daily medications, on amoxicillin for URI, presents with fever complaints.  Has had some sinus congestion and URI symptoms for several days.  Started having some intermittent left low back pain that radiates towards the testicles when he does get a fever.  We does not have a fever he does not have testicular pain or low back pain when he does it fever he gets the pain.  There is no current pain.  No vomiting.  No diarrhea.  No GI bleeding.  No penile discharge.  No trauma.  No dysuria or hematuria but the urine is dark.              Objective:     History reviewed. No pertinent past medical history.           History reviewed. No pertinent surgical history.             Social History     Socioeconomic History    Marital status:    Tobacco Use    Smoking status: Every Day     Current packs/day: 0.00     Types: Cigarettes     Last attempt to quit: 10/7/2017     Years since quittin.3    Smokeless tobacco: Never    Tobacco comments:     1/2 pack daily   Vaping Use    Vaping status: Never Used   Substance and Sexual Activity    Alcohol use: Yes     Alcohol/week: 0.0 standard drinks of alcohol     Comment: social    Drug use: No   Other Topics Concern    Caffeine Concern No    Stress Concern No    Weight Concern No    Special Diet No    Exercise No    Seat Belt Yes                  Physical Exam     ED Triage Vitals [25 1002]   /86   Pulse 97   Resp 16   Temp 99.7 °F (37.6 °C)   Temp src Oral   SpO2 96 %   O2 Device None (Room air)       Current Vitals:   Vital Signs  BP: 126/77  Pulse: 82  Resp: 16  Temp: 99.7 °F (37.6 °C)  Temp src: Oral  MAP (mmHg): 89    Oxygen Therapy  SpO2: 97 %  O2 Device: None (Room  air)        Physical Exam  Vitals and nursing note reviewed.   Constitutional:       Appearance: He is not toxic-appearing.   HENT:      Head: Normocephalic.      Nose: Congestion and rhinorrhea present.      Mouth/Throat:      Pharynx: Oropharynx is clear.   Cardiovascular:      Rate and Rhythm: Normal rate.      Pulses: Normal pulses.   Pulmonary:      Effort: Pulmonary effort is normal. No respiratory distress.   Abdominal:      General: There is no distension.      Palpations: Abdomen is soft.      Tenderness: There is no abdominal tenderness. There is no right CVA tenderness, left CVA tenderness, guarding or rebound.   Genitourinary:     Penis: Normal.       Testes: Normal.      Comments: Uncircumcised penis.  Testicular exam is normal.  No scrotal skin changes.  No tenderness on the bilateral testicles.  No signs of torsion.  Benign exam.  Musculoskeletal:         General: Normal range of motion.      Cervical back: Neck supple.   Skin:     General: Skin is warm and dry.   Neurological:      General: No focal deficit present.      Mental Status: He is alert and oriented to person, place, and time.   Psychiatric:         Behavior: Behavior normal.             ED Course     Labs Reviewed   URINALYSIS WITH CULTURE REFLEX - Abnormal; Notable for the following components:       Result Value    Clarity Urine Ex.Turbid (*)     Ketones Urine 10 (*)     Blood Urine 2+ (*)     Protein Urine 30 (*)     WBC Urine 6-10 (*)     All other components within normal limits   COMP METABOLIC PANEL (14) - Abnormal; Notable for the following components:    BUN 8 (*)     AST 59 (*)     ALT 94 (*)     All other components within normal limits   CBC WITH DIFFERENTIAL WITH PLATELET - Abnormal; Notable for the following components:    RBC 6.14 (*)     HGB 18.5 (*)     All other components within normal limits   LIPASE - Normal   SARS-COV-2/FLU A AND B/RSV BY PCR (GENEXPERT) - Normal    Narrative:     This test is intended for the  qualitative detection and differentiation of SARS-CoV-2, influenza A, influenza B, and respiratory syncytial virus (RSV) viral RNA in nasopharyngeal or nares swabs from individuals suspected of respiratory viral infection consistent with COVID-19 by their healthcare provider. Signs and symptoms of respiratory viral infection due to SARS-CoV-2, influenza, and RSV can be similar.    Test performed using the Xpert Xpress SARS-CoV-2/FLU/RSV (real time RT-PCR)  assay on the GeneXpert instrument, [employer], [place] [address].   This test is being used under the Food and Drug Administration's Emergency Use Authorization.    The authorized Fact Sheet for Healthcare Providers for this assay is available upon request from the laboratory.   RAINBOW DRAW LAVENDER   RAINBOW DRAW LIGHT GREEN   RAINBOW DRAW BLUE   CHLAMYDIA/GONOCOCCUS, MICHEL                   MDM      US SCROTUM W/ DOPPLER (CPT=93975/73182)    Result Date: 1/26/2025  CONCLUSION:  1. No intratesticular mass. 2. Bilateral epididymal cysts include a 2.1 cm left epididymal cyst with left-sided hydrocele and varicosities, correlate clinically.   LOCATION:  [facility]    Dictated by (CST): Rika Henry MD on 1/26/2025 at 1:32 PM     Finalized by (CST): Rika Henry MD on 1/26/2025 at 1:34 PM       CT ABDOMEN+PELVIS KIDNEYSTONE 2D RNDR(NO IV,NO ORAL)(CPT=74176)    Result Date: 1/26/2025  CONCLUSION:   1. Mild prostate enlargement.  There is mild fatty induration thickening surrounding the prostate gland extending into the seminal vesicles that could be due to nonspecific prostatitis.  Clinical correlation recommended.  There is additional nonspecific fatty induration and thickening associated with the right presacral fat best seen on image 178 series 3 which is of unknown clinical significance.  2. No urinary calculi or obstructive uropathy.  3. Diffuse fatty infiltration of the liver.  Please see above for further details.  LOCATION:  [facility]   Dictated by (CST): Roger Rodríguez,  MD on 1/26/2025 at 12:16 PM     Finalized by (CST): Roger Rodríguez MD on 1/26/2025 at 12:24 PM       XR CHEST AP PORTABLE  (CPT=71045)    Result Date: 1/26/2025  CONCLUSION:  Diffuse peribronchial thickening can be seen in viral illness, reactive airway disease, or other interstitial processes.   LOCATION:  [facility]      Dictated by (CST): Rika Henry MD on 1/26/2025 at 11:30 AM     Finalized by (CST): Rika Henry MD on 1/26/2025 at 11:30 AM        I independently interpreted the CT abdomen pelvis, I do note the periprosthetic fat stranding    Significant other at bedside helpful to provide information on history presenting illness    Differential diagnosis includes, but not limited to, kidney stone, UTI, torsion, viral syndrome    External chart review demonstrates internal medicine visit 6 days ago for his URI    39-year-old male presents with prostatitis.  Fat stranding in the prostatic region that extends towards the presacral region with 5 explains his low back pain.  He has no rectal complaints.  No skin changes.  No scrotal skin changes added.  No perineal pain or tenderness.  He has no penile discharge.  Had dark urine but no hematuria or dysuria otherwise.  Does have hematuria on microscopic urine.  Therefore CT was obtained to rule out kidney stone last received a prostatitis and the fat stranding.  He has no white count.  He is borderline febrile here but also has URI symptoms and viral process on his x-ray.  Unsure of the clear etiology of the fever.  Could be multifactorial.  I discussed with him and his significant other at bedside prostatitis in his age group.  E. coli, gonorrhea, chlamydia.  Will give him IM Rocephin and p.o. Doxy.  Will send GC chlamydia on the urine culture as well.  He is nontoxic-appearing.  We will ultrasound his scrotum and testicles to ensure no other pathology with that pain however I suspect it is referred pain.  Will plan to discharge home with doxycycline, cultures pending  and urology follow-up provided.  He is comfortable with this plan.  Did discuss bacteremia, worsening infection etc.  Strict return precaution provided.  Further workup offer considered and discussed    Patient was screened and evaluated during this visit.  As the treating physician attending to the patient, I determined within reasonable clinical confidence and prior to discharge, that an emergency medical condition was not or was no longer present.  There was no indication for further evaluation, treatment, or admission on an emergency basis.  Comprehensive verbal and written discharge and follow-up instructions were provided to help prevent relapse or worsening.  Patient was instructed to follow-up with their primary care provider for further evaluation and treatment, return immediately to ER for worsening, concerning, new, or changing/persisting symptoms. I discussed the case with the patient and they had no questions, complaints, or concerns.  Patient was comfortable going home.     Per the discharge paperwork, patients are encouraged to and given instructions on how to sign up for MyChart, where they have access to their records, including any/all incidental findings.     This note was prepared using Dragon Medical voice recognition dictation software. As a result errors may occur. When identified these errors have been corrected. While every attempt is made to correct errors during dictation discrepancies may still exist    Note to patient: The 21st Century Cures Act makes medical notes like these available to patients in the interest of transparency. However, this is a medical document intended as peer to peer communication. It is written in medical language and may contain abbreviations or verbiage that are unfamiliar. It may appear blunt or direct. Medical documents are intended to carry relevant information, facts as evident, and the clinical opinion of the practitioner.           Medical Decision  Making      Disposition and Plan     Clinical Impression:  1. Acute prostatitis    2. Viral URI with cough    3. Epididymal cyst    4. Varicocele    5. Other hydrocele         Disposition:  Discharge  1/26/2025  1:37 pm    Follow-up:  Pedro Merino DO  [address] [place] [address] [address]  [facility] [address]  [phone]    Follow up      Ludin Smallwood DO  [address] [facility] [address]  [place] [address] [phone]    Schedule an appointment as soon as possible for a visit            Medications Prescribed:  Current Discharge Medication List        START taking these medications    Details   doxycycline 100 MG Oral Cap Take 1 capsule (100 mg total) by mouth 2 (two) times daily for 14 days.  Qty: 28 capsule, Refills: 0                 Supplementary Documentation:

## 2025-01-26 NOTE — PROGRESS NOTES
Jericho Guillory Jr. is a 39 year old male who presents to [facility] with c/o bilateral testicle pain with fever for 48 hours. Patient also reports pain radiates to left low back. Patient reports decreased appetite and feels \"dehydrated\". Patient recently started on antibiotic (Augmentin) for lower respiratory infection by PCP on 1/21/2025. Discussed with patient severity of symptoms and the inability to rule out malignant etiologies associated in the [facility] setting. Limitations of the [facility] explained to patient regarding ability to perform required and necessary evaluation and treatments, such as: radiological imaging, EKG testing, laboratory testing, and IVF/medication administration. Luis Potts MD consulted for transfer and referred to ER for need for further evaluation and treatment.     Accompanied by: self  After triage, higher acuity of care was recommended to Jericho Guillory JrJohn today.   Rationale: Due to limitations of the [facility], patient is advised to go to Emergency Room for further evaluation and treatment.  Site recommendation: [facility]  Patient verbalized understanding of rationale for further evaluation and was stable upon discharge.  Vitals:    01/26/25 0925   BP: 133/88   BP Location: Left arm   Patient Position: Sitting   Cuff Size: adult   Pulse: 93   Resp: 18   Temp: 100.3 °F (37.9 °C)   TempSrc: Oral   SpO2: 98%   Weight: 192 lb (87.1 kg)   Height: 5' 11\" (1.803 m)

## 2025-01-26 NOTE — ED INITIAL ASSESSMENT (HPI)
PT states that he has been experiencing testicular pain with fever for two days, directed to come to the ED by urgent care. , adds pain to the lower back (left side) , low appetite, nausea. PT adds that he has been taking Ibuprofen at home ( last dose last night) . No diarrhea, no urinary changes reported

## 2025-01-27 ENCOUNTER — OFFICE VISIT (OUTPATIENT)
Dept: FAMILY MEDICINE CLINIC | Facility: CLINIC | Age: 40
End: 2025-01-27
Payer: COMMERCIAL

## 2025-01-27 DIAGNOSIS — J06.9 VIRAL URI: ICD-10-CM

## 2025-01-27 DIAGNOSIS — N41.0 ACUTE PROSTATITIS: Primary | ICD-10-CM

## 2025-01-27 LAB
C TRACH DNA SPEC QL NAA+PROBE: NEGATIVE
N GONORRHOEA DNA SPEC QL NAA+PROBE: NEGATIVE

## 2025-01-27 PROCEDURE — 99214 OFFICE O/P EST MOD 30 MIN: CPT | Performed by: FAMILY MEDICINE

## 2025-01-27 RX ORDER — ONDANSETRON 8 MG/1
8 TABLET, ORALLY DISINTEGRATING ORAL EVERY 8 HOURS PRN
Qty: 30 TABLET | Refills: 0 | Status: SHIPPED | OUTPATIENT
Start: 2025-01-27

## 2025-01-27 RX ORDER — CIPROFLOXACIN 500 MG/1
500 TABLET, FILM COATED ORAL 2 TIMES DAILY
Qty: 42 TABLET | Refills: 0 | Status: SHIPPED | OUTPATIENT
Start: 2025-01-27 | End: 2025-02-17

## 2025-01-28 ENCOUNTER — PATIENT MESSAGE (OUTPATIENT)
Dept: SURGERY | Facility: CLINIC | Age: 40
End: 2025-01-28

## 2025-01-28 ENCOUNTER — OFFICE VISIT (OUTPATIENT)
Dept: SURGERY | Facility: CLINIC | Age: 40
End: 2025-01-28
Payer: COMMERCIAL

## 2025-01-28 DIAGNOSIS — N50.3 EPIDIDYMAL CYST: ICD-10-CM

## 2025-01-28 DIAGNOSIS — N41.0 ACUTE PROSTATITIS: Primary | ICD-10-CM

## 2025-01-28 DIAGNOSIS — R82.90 URINE FINDING: ICD-10-CM

## 2025-01-28 LAB
APPEARANCE: CLEAR
GLUCOSE (URINE DIPSTICK): NEGATIVE MG/DL
KETONES (URINE DIPSTICK): 40 MG/DL
LEUKOCYTES: NEGATIVE
MULTISTIX LOT#: ABNORMAL NUMERIC
NITRITE, URINE: NEGATIVE
PH, URINE: 5.5 (ref 4.5–8)
PROTEIN (URINE DIPSTICK): 100 MG/DL
SPECIFIC GRAVITY: 1.03 (ref 1–1.03)
UROBILINOGEN,SEMI-QN: 0.2 MG/DL (ref 0–1.9)

## 2025-01-28 PROCEDURE — 81003 URINALYSIS AUTO W/O SCOPE: CPT | Performed by: SURGERY

## 2025-01-28 PROCEDURE — 99204 OFFICE O/P NEW MOD 45 MIN: CPT | Performed by: SURGERY

## 2025-01-28 RX ORDER — TAMSULOSIN HYDROCHLORIDE 0.4 MG/1
0.4 CAPSULE ORAL EVERY EVENING
Qty: 90 CAPSULE | Refills: 6 | Status: SHIPPED | OUTPATIENT
Start: 2025-01-28

## 2025-01-28 NOTE — PROGRESS NOTES
Urology Clinic Note - New Patient    Referring Provider:  No referring provider defined for this encounter.     Primary Care Provider:  Pedro Merino, DO     Chief Complaint:   Prostatitis vs UTI?,  Epididymal cyst    HPI & Assessment:   Jericho Guillory Jr. is a 39 year old male with no PMH referred for prostatitis.  He went to the ED on 1/26/2025 for URI and left lower back pain rating towards the testicles.  No dysuria or hematuria.  Scrotal ultrasound was obtained and this showed a 6 mm right epididymal cyst and 2.1 cm left epididymal cyst, left varicocele.  CT scan showed mild prostatic enlargement with fat stranding surrounding the prostate into the seminal vesicles that could be related to prostatitis, no stones or hydronephrosis moderate stool distention of the colon.  He was given ceftriaxone and doxycycline and discharged on p.o. doxycycline.  Urinalysis showed 6-10 WBC and turbid urine, but no urine culture was performed.    He saw his PCP yesterday and at that time was having low back pain, fevers, nausea/vomiting.  Antibiotics changed to Cipro.    Today he continues to endorse left lower back and left sided abdominal pain.  He denies painful urination but endorses weak, intermittent stream at times.  He did have some similar urinary symptoms prior to this episode.  He reports a fever up to 103 two days ago, most recent temperature 100.3 today.  Feeling slightly better after starting Cipro yesterday.  No tenderness or bogginess on NEFTALI.  No testicular or epididymal tenderness.  Mild perineal discomfort.    Overall I am not sure if he has true prostatitis based on his symptoms.  He may have a standard urinary tract infection with possible ascending infection to the left kidney.  No obstructing lesion or hydronephrosis.  I recommend following up urine culture, continue antibiotics, starting tamsulosin.  I discussed with patient that if he continues to have a fever over 100 tomorrow that he should go to  the ED for repeat imaging with contrast and possible admission for IV antibiotics.    AUA symptom score is 21/3 with LUTS.    Post-void residual bladder scan: 0 mL    Urinalysis (clinic dip): Moderate blood, otherwise negative    Plan:   -Continue Cipro  -Follow-up urine culture  -Start tamsulosin 0.4 mg daily  -Go to ED if worsening fevers or persistent fever over 100 by tomorrow  -Recommend observation for small epididymal cyst       PSA:  No results found for: \"PSA\", \"PERCENTPSA\", \"PSAS\", \"PSAULTRA\", \"QPSA\", \"PSATOT\", \"TOTPSADX\", \"TOTPSASCREEN\"     History:   No past medical history on file.    No past surgical history on file.    Family History   Problem Relation Age of Onset    Cancer Father         prostate ca    Diabetes Maternal Grandmother     Other (Other) Sister         thyroid       Social History     Socioeconomic History    Marital status:    Tobacco Use    Smoking status: Every Day     Current packs/day: 0.00     Types: Cigarettes     Last attempt to quit: 10/7/2017     Years since quittin.3    Smokeless tobacco: Never    Tobacco comments:     1/2 pack daily   Vaping Use    Vaping status: Never Used   Substance and Sexual Activity    Alcohol use: Yes     Alcohol/week: 0.0 standard drinks of alcohol     Comment: social    Drug use: No   Other Topics Concern    Caffeine Concern No    Stress Concern No    Weight Concern No    Special Diet No    Exercise No    Seat Belt Yes       Medications (Active prior to today's visit):  Current Outpatient Medications   Medication Sig Dispense Refill    ciprofloxacin 500 MG Oral Tab Take 1 tablet (500 mg total) by mouth 2 (two) times daily for 21 days. 42 tablet 0    ondansetron 8 MG Oral Tablet Dispersible Take 1 tablet (8 mg total) by mouth every 8 (eight) hours as needed for Nausea. 30 tablet 0    doxycycline 100 MG Oral Cap Take 1 capsule (100 mg total) by mouth 2 (two) times daily for 14 days. 28 capsule 0    azelastine 0.1 % Nasal Solution 1 spray by  Nasal route 2 (two) times daily. (Patient not taking: Reported on 1/27/2025) 30 mL 0    benzonatate 100 MG Oral Cap Take 1 capsule (100 mg total) by mouth 3 (three) times daily as needed. (Patient not taking: Reported on 1/27/2025) 30 capsule 0    montelukast 10 MG Oral Tab Take 1 tablet (10 mg total) by mouth nightly. (Patient not taking: Reported on 1/27/2025)         Allergies:  Allergies[1]      Review of Systems:   A comprehensive 10-point review of systems was completed.  Pertinent positives and negatives are noted in the the HPI.    Physical Exam:   CONSTITUTIONAL: Well developed, well nourished, in no acute distress  NEUROLOGIC: Alert and oriented  HEAD: Normocephalic, atraumatic  EYES: Sclera non-icteric  ENT: Hearing intact, moist mucous membranes  NECK: No obvious goiter or masses  RESPIRATORY: Normal respiratory effort  SKIN: No evident rashes  ABDOMEN: Soft, non-tender, mild LLQ tenderness and left flank tenderness  GENITOURINARY: Normal phallus, orthotopic meatus, normal bilateral testicles, no epididymal tenderness  DIGITAL RECTAL EXAM: ~30 gram prostate, no nodules or tenderness    Thank you for this consult.    I have personally reviewed all relevant medical records, labs, and imaging.    Medical Decision Making  Prostatitis: Undiagnosed new problem  UTI: Undiagnosed new problem  BPH: Undiagnosed new problem  Epididymal cyst: Undiagnosed new problem, palpable left epididymal head cyst without tenderness    Amount and/or Complexity of Data Reviewed  External Data Reviewed: labs and notes.  Labs: ordered.  Radiology: independent interpretation performed.    Risk  Prescription drug management.        Spencer De Guzman MD  Staff Urologist  [facility]  Office: [phone]             [1] No Known Allergies

## 2025-01-30 ENCOUNTER — PATIENT MESSAGE (OUTPATIENT)
Dept: FAMILY MEDICINE CLINIC | Facility: CLINIC | Age: 40
End: 2025-01-30

## 2025-02-03 ENCOUNTER — OFFICE VISIT (OUTPATIENT)
Dept: INTERNAL MEDICINE CLINIC | Facility: CLINIC | Age: 40
End: 2025-02-03
Payer: COMMERCIAL

## 2025-02-03 ENCOUNTER — TELEPHONE (OUTPATIENT)
Dept: SURGERY | Facility: CLINIC | Age: 40
End: 2025-02-03

## 2025-02-03 VITALS
DIASTOLIC BLOOD PRESSURE: 70 MMHG | OXYGEN SATURATION: 98 % | BODY MASS INDEX: 27.3 KG/M2 | WEIGHT: 195 LBS | RESPIRATION RATE: 19 BRPM | HEIGHT: 71 IN | HEART RATE: 87 BPM | SYSTOLIC BLOOD PRESSURE: 120 MMHG

## 2025-02-03 DIAGNOSIS — L53.9 ARM ERYTHEMA: ICD-10-CM

## 2025-02-03 DIAGNOSIS — M79.601 RIGHT ARM PAIN: Primary | ICD-10-CM

## 2025-02-03 PROCEDURE — 99213 OFFICE O/P EST LOW 20 MIN: CPT | Performed by: INTERNAL MEDICINE

## 2025-02-03 RX ORDER — SULFAMETHOXAZOLE AND TRIMETHOPRIM 800; 160 MG/1; MG/1
1 TABLET ORAL 2 TIMES DAILY
Qty: 42 TABLET | Refills: 0 | Status: SHIPPED | OUTPATIENT
Start: 2025-02-03 | End: 2025-02-24

## 2025-02-03 RX ORDER — MELOXICAM 7.5 MG/1
7.5 TABLET ORAL DAILY
Qty: 30 TABLET | Refills: 0 | Status: SHIPPED | OUTPATIENT
Start: 2025-02-03

## 2025-02-03 NOTE — PATIENT INSTRUCTIONS
- Start different antibiotic, trimethoprim-sulfamethoxazole.  Take 1 tablet twice daily for next 3 weeks.  - Start prescription anti-inflammatory, meloxicam.  Take 1 tablet daily with food.  - Use ice packs or warm compresses on the elbow area  - Go to emergency department if you have high fevers or if arm pain/redness gets worse.    It was a pleasure seeing you in the clinic today.  Thank you for choosing the [facility] office for your healthcare needs. Please call at [phone] with any questions or concerns.    Debbi Duval MD

## 2025-02-03 NOTE — PROGRESS NOTES
Jericho Guillory Jr. is a 39 year old male.   HPI:     Patient presents with right arm pain.  Right medial elbow area.  For past 3-4 days.  He was in ED a week ago for possible prostatitis/UTI.  Currently on ciprofloxacin for this.  He has pain with movements of right arm/elbow.  Had fevers last week when he went to the ED but no fevers past 1-2 days.     Past medical, family, surgical and social history were reviewed as listed in the chart, and are unchanged from previous visit on 1/20/2025  REVIEW OF SYSTEMS:   GENERAL/ const: no fevers/chills, no unintentional weight loss  SKIN: erythema right elbow  HEENT: denies sinus pain or sinus tenderness  LUNGS: denies shortness of breath   CARDIOVASCULAR: denies chest pain  GI: denies nausea/emesis/ abdominal pain diarrhea constipation  MUSCULOSKELETAL: pain right elbow/arm  ALLERGY: Allergies[1]  PAST HISTORY:     Current Outpatient Medications:     sulfamethoxazole-trimethoprim -160 MG Oral Tab per tablet, Take 1 tablet by mouth 2 (two) times daily for 21 days., Disp: 42 tablet, Rfl: 0    Meloxicam 7.5 MG Oral Tab, Take 1 tablet (7.5 mg total) by mouth daily., Disp: 30 tablet, Rfl: 0    tamsulosin 0.4 MG Oral Cap, Take 1 capsule (0.4 mg total) by mouth every evening., Disp: 90 capsule, Rfl: 6    ciprofloxacin 500 MG Oral Tab, Take 1 tablet (500 mg total) by mouth 2 (two) times daily for 21 days., Disp: 42 tablet, Rfl: 0    ondansetron 8 MG Oral Tablet Dispersible, Take 1 tablet (8 mg total) by mouth every 8 (eight) hours as needed for Nausea., Disp: 30 tablet, Rfl: 0    doxycycline 100 MG Oral Cap, Take 1 capsule (100 mg total) by mouth 2 (two) times daily for 14 days., Disp: 28 capsule, Rfl: 0    azelastine 0.1 % Nasal Solution, 1 spray by Nasal route 2 (two) times daily. (Patient not taking: Reported on 1/27/2025), Disp: 30 mL, Rfl: 0    benzonatate 100 MG Oral Cap, Take 1 capsule (100 mg total) by mouth 3 (three) times daily as needed. (Patient not taking:  Reported on 1/27/2025), Disp: 30 capsule, Rfl: 0    montelukast 10 MG Oral Tab, Take 1 tablet (10 mg total) by mouth nightly. (Patient not taking: Reported on 1/27/2025), Disp: , Rfl:   Medical:  has a past medical history of Allergic rhinitis and Anxiety.  Surgical:  has no past surgical history on file.  Family: family history includes Cancer in his father; Diabetes in his maternal grandmother; Other in his sister.  Social:  reports that he has been smoking cigarettes. He has never used smokeless tobacco. He reports that he does not currently use alcohol. He reports that he does not use drugs.  Wt Readings from Last 6 Encounters:   02/03/25 195 lb (88.5 kg)   01/26/25 193 lb (87.5 kg)   01/26/25 192 lb (87.1 kg)   01/20/25 197 lb 6.4 oz (89.5 kg)   10/03/24 193 lb (87.5 kg)   07/03/24 192 lb 6.4 oz (87.3 kg)     EXAM:   /70   Pulse 87   Resp 19   Ht 5' 11\" (1.803 m)   Wt 195 lb (88.5 kg)   SpO2 98%   BMI 27.20 kg/m²   GENERAL: Alert and oriented, well developed, well nourished,in no apparent distress  SKIN: warmth, erythema right medial elbow  HEENT: atraumatic, PERRLA, EOMI, normal lid and conjunctiva  LUNGS: clear to auscultation bilaterally, no wheezing/rubs  CARDIO: RRR without murmurs.  No clubbing, cyanosis or edema.  GI: soft non tender nondistended no hepatosplenomegaly, bowel sounds throughout  NEURO: CN II-XII intact, 5/5 strength all extremities  MS: Full ROM, pain with flexion/extension of right arm, less pain with rotation of arm  PSYCH: pleasant, appropriate mood and affect  ASSESSMENT AND PLAN:   1. Right arm pain  2. Arm erythema  Patient with erythema, pain in right medial epicondylar area for past 3-4 days.  Was at ED a week or so ago with prostatitis/UTI symptoms.  On doxycycline at first, then now on ciprofloxacin.  Erythema, pain persisting in elbow.  Area warm, tender to touch.  Pain with flexion/extension of elbow.  Will switch antibiotics to Bactrim DS.  Meloxicam prescribed.  Recommend warm compresses/ice packs.  Advised patient to go to emergency department with any fevers/chills, worsening elbow pain, worsening/spreading skin erythema in the area - may need IV antibiotics.  - sulfamethoxazole-trimethoprim -160 MG Oral Tab per tablet; Take 1 tablet by mouth 2 (two) times daily for 21 days.  Dispense: 42 tablet; Refill: 0  - Meloxicam 7.5 MG Oral Tab; Take 1 tablet (7.5 mg total) by mouth daily.  Dispense: 30 tablet; Refill: 0    Patient Care Team:  Pedro Merino DO as PCP - General (Family Medicine)  Erika Ravi DPM (PODIATRIST)  The patient indicates understanding of these issues and agrees to the plan.  The patient is asked to return to clinic as needed with myself/as scheduled or due with Dr. Angelique Duval MD           [1] No Known Allergies

## 2025-02-04 ENCOUNTER — TELEPHONE (OUTPATIENT)
Dept: SURGERY | Facility: CLINIC | Age: 40
End: 2025-02-04

## 2025-02-04 NOTE — TELEPHONE ENCOUNTER
Can someone call and see if patient is still having fevers?  If so he should go to the emergency room.    PCR urine culture resulted negative for UTI.

## 2025-02-05 NOTE — TELEPHONE ENCOUNTER
This encounter is now closed.     RN called patient to follow up on his symptoms and fever. No answer. Left message.   RN reached out via [employer] as well.

## 2025-03-02 DIAGNOSIS — M79.601 RIGHT ARM PAIN: ICD-10-CM

## 2025-03-02 DIAGNOSIS — L53.9 ARM ERYTHEMA: ICD-10-CM

## 2025-03-03 RX ORDER — MELOXICAM 7.5 MG/1
7.5 TABLET ORAL DAILY
Qty: 30 TABLET | Refills: 0 | Status: SHIPPED | OUTPATIENT
Start: 2025-03-03

## 2025-03-03 NOTE — TELEPHONE ENCOUNTER
LOV: 2/3/2025 with Dr. Duval  RTC: \"as needed with myself/as scheduled or due with Dr. Merino\"  Last Relevant Labs: January 2025  Filled: 2/3/2025    #30 with 0 refills    No future appointments.

## 2025-03-11 ENCOUNTER — OFFICE VISIT (OUTPATIENT)
Dept: INTERNAL MEDICINE CLINIC | Facility: CLINIC | Age: 40
End: 2025-03-11
Payer: COMMERCIAL

## 2025-03-11 VITALS
HEART RATE: 95 BPM | TEMPERATURE: 98 F | DIASTOLIC BLOOD PRESSURE: 82 MMHG | HEIGHT: 71 IN | BODY MASS INDEX: 26.6 KG/M2 | WEIGHT: 190 LBS | SYSTOLIC BLOOD PRESSURE: 134 MMHG | RESPIRATION RATE: 14 BRPM

## 2025-03-11 DIAGNOSIS — L30.9 DERMATITIS: Primary | ICD-10-CM

## 2025-03-11 DIAGNOSIS — J30.89 ENVIRONMENTAL AND SEASONAL ALLERGIES: ICD-10-CM

## 2025-03-11 PROCEDURE — 99213 OFFICE O/P EST LOW 20 MIN: CPT | Performed by: FAMILY MEDICINE

## 2025-03-11 RX ORDER — TRIAMCINOLONE ACETONIDE 1 MG/G
1 CREAM TOPICAL 3 TIMES DAILY
Qty: 80 G | Refills: 0 | Status: SHIPPED | OUTPATIENT
Start: 2025-03-11

## 2025-03-11 RX ORDER — PREDNISONE 10 MG/1
TABLET ORAL
Qty: 20 TABLET | Refills: 0 | Status: CANCELLED | OUTPATIENT
Start: 2025-03-11

## 2025-03-11 RX ORDER — METHYLPREDNISOLONE 4 MG/1
TABLET ORAL
Qty: 1 EACH | Refills: 0 | Status: SHIPPED | OUTPATIENT
Start: 2025-03-11

## 2025-03-11 NOTE — PROGRESS NOTES
Jericho Guillory Jr. is a 39 year old male with a hx of :    Patient Active Problem List   Diagnosis    Lipoma of back    Lipoma of forearm    Submuscular lipoma of chest    Tobacco abuse    Rhytides     Past Medical History:    Allergic rhinitis    Anxiety       HPI:  Chief Complaint   Patient presents with    Skin     Recurring rashes are back on both legs, forearms, and stomach area     This is a new problem. It started when he was on vacation in [place] The affected locations include lower legs, arms ans abdomen. The rash is characterized by red papular rash that is itchy on and off. Exposure: being out doors,playing with niece. Past treatments include an anti fungal cream which does not help. Pt feels he is allergic to something. Discussed getting seen by an allergist.  New Soaps:  no  New Detergents:  no  New Clothes:  no  New plants/working outdoors: was playing outside with his niece in [place]      REVIEW OF SYSTEMS:  GENERAL: Patient has been doing well otherwise  SKIN: As above  LUNGS: No shortness of breath or wheezing  GI: No vomiting or diarrhea,or abdominal pain      EXAM:  /82   Pulse 95   Temp 98 °F (36.7 °C) (Temporal)   Resp 14   Ht 5' 11\" (1.803 m)   Wt 190 lb (86.2 kg)   BMI 26.50 kg/m²   GENERAL: WNWD, 39 year old male in NAD, non-toxic  NECK:no adenopathy  LUNGS: clear to auscultation, Bilaterally  CARDIO: RRR without murmur  SKIN:  arms, legs and abdomen- red raised papula with general erythema with some excoriation from itching.    ASSESSMENT AND PLAN:  Encounter Diagnoses   Name Primary?    Environmental and seasonal allergies Yes    Dermatitis        No orders of the defined types were placed in this encounter.      Meds & Refills for this Visit:  Requested Prescriptions     Signed Prescriptions Disp Refills    triamcinolone 0.1 % External Cream 80 g 0     Sig: Apply 1 Application topically 3 (three) times daily.    methylPREDNISolone (MEDROL) 4 MG Oral Tablet Therapy Pack 1 each 0      Sig: As directed.       Imaging & Consults:  None     1. Environmental and seasonal allergies  Start back on Zyrtec daily  - Allergy Referral - In Network    2. Dermatitis  - apply steroid cream to clean affected areas.  -start oral steroids and take it with food.  - Allergy Referral - In Network    F/U if symptoms worsen or do not improve or concerns increase.  The patient indicates understanding of these issues and agrees to the plan.

## 2025-07-02 ENCOUNTER — OFFICE VISIT (OUTPATIENT)
Dept: INTERNAL MEDICINE CLINIC | Facility: CLINIC | Age: 40
End: 2025-07-02
Payer: COMMERCIAL

## 2025-07-02 VITALS
BODY MASS INDEX: 25.65 KG/M2 | SYSTOLIC BLOOD PRESSURE: 124 MMHG | HEIGHT: 71 IN | OXYGEN SATURATION: 98 % | DIASTOLIC BLOOD PRESSURE: 78 MMHG | HEART RATE: 76 BPM | WEIGHT: 183.19 LBS | RESPIRATION RATE: 14 BRPM
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
## 2025-07-02 PROCEDURE — 99213 OFFICE O/P EST LOW 20 MIN: CPT | Performed by: FAMILY MEDICINE

## 2025-07-02 RX ORDER — CEPHALEXIN 500 MG/1
500 CAPSULE ORAL 2 TIMES DAILY
Qty: 20 CAPSULE | Refills: 0 | Status: SHIPPED | OUTPATIENT
Start: 2025-07-02

## 2025-07-02 NOTE — PROGRESS NOTES
CHIEF COMPLAINT:     Chief Complaint   Patient presents with    Insect Bite     Upper thighs.        HPI:   Jericho Guillory Jr. is a 40 year old male .  The patient complains of a probable insect bite. Noticed the bites 14 days ago but got 2 new ones yesterday when he was sitting outside last night.The bites are located on both upper thighs . The bites are itch and sore. Has has had  similar bites in the past.    Current Medications[1]   Past Medical History[2]   Social History:  Short Social Hx on File[3]     REVIEW OF SYSTEMS:   GENERAL: Denies fever, chills,weight change, decreased appetite  SKIN:see HPI  EYES: Denies blurred vision or double vision  HENT: Denies congestion, rhinorrhea, sore throat or ear pain  CHEST: Denies chest pain, or palpitations  LUNGS: Denies shortness of breath, cough, or wheezing  GI: Denies abdominal pain, N/V/C/D.   MUSCULOSKELETAL: no arthralgia or swollen joints  LYMPH:  Denies lymphadenopathy  NEURO: Denies headaches or lightheadedness      EXAM:   /78 (BP Location: Left arm, Patient Position: Sitting, Cuff Size: adult)   Pulse 76   Resp 14   Ht 5' 11\" (1.803 m)   Wt 183 lb 3.2 oz (83.1 kg)   SpO2 98%   BMI 25.55 kg/m²   GENERAL: well developed, well nourished,in no apparent distress  SKIN: scattered red raised papula on bilateral upper thighs with surrounding erythema. Areas are warm and sore to touch  HEAD: atraumatic, normocephalic  EYES: conjunctiva clear  LUNGS: clear to auscultation bilaterally, no wheezes or rhonchi. Breathing is non labored.  CARDIO: RRR without murmur      Physical Exam    ASSESSMENT AND PLAN:     Encounter Diagnosis   Name Primary?    Bug bite with infection, initial encounter Yes       No orders of the defined types were placed in this encounter.      Meds & Refills for this Visit:  Requested Prescriptions     Signed Prescriptions Disp Refills    cephALEXin 500 MG Oral Cap 20 capsule 0     Sig: Take 1 capsule (500 mg total) by mouth 2 (two)  times daily.       Imaging & Consults:  None    PLAN:    Skin care discussed with patient.  Benadryl or zyrtec for the itching. Pt to watch the area or increase in redness,pain ,drg. Medications as above.   The patient indicates understanding of these issues and agrees to the plan.  The patient is asked to return if not improving         [1]   Current Outpatient Medications   Medication Sig Dispense Refill    cephALEXin 500 MG Oral Cap Take 1 capsule (500 mg total) by mouth 2 (two) times daily. 20 capsule 0    triamcinolone 0.1 % External Cream Apply 1 Application topically 3 (three) times daily. 80 g 0    montelukast 10 MG Oral Tab Take 1 tablet (10 mg total) by mouth nightly.      methylPREDNISolone (MEDROL) 4 MG Oral Tablet Therapy Pack As directed. 1 each 0    azelastine 0.1 % Nasal Solution 1 spray by Nasal route 2 (two) times daily. (Patient not taking: Reported on 2025) 30 mL 0   [2]   Past Medical History:   Allergic rhinitis    Anxiety   [3]   Social History  Socioeconomic History    Marital status:    Tobacco Use    Smoking status: Some Days     Current packs/day: 0.00     Types: Cigarettes     Last attempt to quit: 10/7/2017     Years since quittin.7    Smokeless tobacco: Never    Tobacco comments:     1/2 pack daily   Vaping Use    Vaping status: Never Used   Substance and Sexual Activity    Alcohol use: Not Currently     Comment: social    Drug use: Never   Other Topics Concern    Caffeine Concern No    Stress Concern No    Weight Concern No    Special Diet No    Exercise No    Seat Belt Yes     Social Drivers of Health     Food Insecurity: No Food Insecurity (2023)    Received from [facility]    Hunger Vital Sign     Worried About Running Out of Food in the Last Year: Never true     Ran Out of Food in the Last Year: Never true   Transportation Needs: No Transportation Needs (2023)    Received from [facility]    PRAPARE - Transportation     Lack of  Transportation (Medical): No     Lack of Transportation (Non-Medical): No   Stress: No Stress Concern Present (6/26/2023)    Received from [facility] - Occupational Stress Questionnaire     Feeling of Stress : Not at all   Housing Stability: Unknown (6/26/2023)    Received from [facility]    Housing Stability Vital Sign     Unable to Pay for Housing in the Last Year: No     Unstable Housing in the Last Year: No

## (undated) NOTE — LETTER
Dr. Wilton Hashimoto D.PELIJAH.   [facility] - ORTHOPEDICS  [address].  9/2/2021    Post Operative Nail/Wart Instructions    Soaking solution: Epsom Salt: 1/2 cup of Ep

## (undated) NOTE — LETTER
Date: 6/10/2024    Patient Name: Jericho Guillory Jr.          To Whom it may concern:    This letter has been written at the patient's request. The above patient was seen at [facility] for treatment of a medical condition.    This patient should be excused from attending work/school from 6/11/24 through 6/11/24.    The patient may return to work/school on next scheduled shift with the following limitations none.    Sincerely,        Krupa Francisco, DO

## (undated) NOTE — LETTER
AUTHORIZATION FOR SURGICAL OPERATION OR OTHER PROCEDURE    1.  I hereby authorize Dr. Nena Rosenberg, and [facility] staff assigned to my case to perform the following operation and/or procedure at the [facility]:    ________________________________ Witness signature: ___________________________________________________ Date:  ______/______/_____                    Time:  ________ A. M.  P.M.        Patient Name:  ______________________________________________________  (please print)      Patient signatu

## (undated) NOTE — LETTER
Date: 6/10/2024    Patient Name: Jericho Guillory Jr.          To Whom it may concern:    This letter has been written at the patient's request. The above patient was seen at [facility] for treatment of a medical condition.    This patient should be excused from attending work/school from 6/11/24 through 6/11/24.    The patient may return to work/school on next scheduled shift with the following limitations none.    Sincerely,        Krupa Francisco, DO

## (undated) NOTE — LETTER
AUTHORIZATION FOR SURGICAL OPERATION OR OTHER PROCEDURE    1. I hereby authorize Dr. Boston, and [facility] staff assigned to my case to perform the following operation and/or procedure at the [facility] site:    _______________________________________________________________________________________________    Left hallux total nail avulsion  _______________________________________________________________________________________________    2.  My physician has explained the nature and purpose of the operation or other procedure, possible alternative methods of treatment, the risks involved, and the possibility of complication to me.  I acknowledge that no guarantee has been made as to the result that may be obtained.  3.  I recognize that, during the course of this operation, or other procedure, unforseen conditions may necessitate additional or different procedure than those listed above.  I, therefore, further authorize and request that the above named physician, his/her physician assistants or designees perform such procedures as are, in his/her professional opinion, necessary and desirable.  4.  Any tissue or organs removed in the operation or other procedure may be disposed of by and at the discretion of the [facility] and [facility].  5.  I understand that in the event of a medical emergency, I will be transported by local paramedics to [facility] or other hospital emergency department.  6.  I certify that I have read and fully understand the above consent to operation and/or other procedure.    7.  I acknowledge that my physician has explained sedation/analgesia administration to me including the risks and benefits.  I consent to the administration of sedation/analgesia as may be necessary or desirable in the judgement of my physician.    Witness signature: ___________________________________________________ Date:  ______/______/_____                     Time:  ________ A.M.  P.M.       Patient Name:  ______________________________________________________  (please print)      Patient signature:  ___________________________________________________             Relationship to Patient:           []  Parent    Responsible person                          []  Spouse  In case of minor or                    [] Other  _____________   Incompetent name:  __________________________________________________                               (please print)      _____________      Responsible person  In case of minor or  Incompetent signature:  _______________________________________________    Statement of Physician  My signature below affirms that prior to the time of the procedure, I have explained to the patient and/or his/her guardian, the risks and benefits involved in the proposed treatment and any reasonable alternative to the proposed treatment.  I have also explained the risks and benefits involved in the refusal of the proposed treatment and have answered the patient's questions.                        Date:  ______/______/_______  Provider                      Signature:  __________________________________________________________       Time:  ___________ A.M    P.M.